# Patient Record
Sex: FEMALE | Race: WHITE | NOT HISPANIC OR LATINO | Employment: OTHER | URBAN - METROPOLITAN AREA
[De-identification: names, ages, dates, MRNs, and addresses within clinical notes are randomized per-mention and may not be internally consistent; named-entity substitution may affect disease eponyms.]

---

## 2024-06-14 ENCOUNTER — APPOINTMENT (OUTPATIENT)
Dept: CARDIOLOGY | Facility: HOSPITAL | Age: 74
DRG: 065 | End: 2024-06-14
Payer: MEDICARE

## 2024-06-14 ENCOUNTER — HOSPITAL ENCOUNTER (INPATIENT)
Facility: HOSPITAL | Age: 74
DRG: 065 | End: 2024-06-14
Attending: STUDENT IN AN ORGANIZED HEALTH CARE EDUCATION/TRAINING PROGRAM | Admitting: INTERNAL MEDICINE
Payer: MEDICARE

## 2024-06-14 ENCOUNTER — APPOINTMENT (OUTPATIENT)
Dept: RADIOLOGY | Facility: HOSPITAL | Age: 74
DRG: 065 | End: 2024-06-14
Payer: MEDICARE

## 2024-06-14 DIAGNOSIS — I63.9 ACUTE ISCHEMIC STROKE (MULTI): ICD-10-CM

## 2024-06-14 DIAGNOSIS — I63.59 CEREBRAL INFARCTION DUE TO UNSPECIFIED OCCLUSION OR STENOSIS OF OTHER CEREBRAL ARTERY (MULTI): ICD-10-CM

## 2024-06-14 DIAGNOSIS — D50.9 IRON DEFICIENCY ANEMIA, UNSPECIFIED IRON DEFICIENCY ANEMIA TYPE: ICD-10-CM

## 2024-06-14 DIAGNOSIS — R09.89 SYMPTOMS OF CEREBROVASCULAR ACCIDENT: Primary | ICD-10-CM

## 2024-06-14 DIAGNOSIS — K92.2 GASTROINTESTINAL HEMORRHAGE, UNSPECIFIED GASTROINTESTINAL HEMORRHAGE TYPE: ICD-10-CM

## 2024-06-14 DIAGNOSIS — E03.9 HYPOTHYROIDISM, UNSPECIFIED TYPE: ICD-10-CM

## 2024-06-14 LAB
ALBUMIN SERPL BCP-MCNC: 4.2 G/DL (ref 3.4–5)
ALP SERPL-CCNC: 111 U/L (ref 33–136)
ALT SERPL W P-5'-P-CCNC: 15 U/L (ref 7–45)
ANION GAP SERPL CALC-SCNC: 13 MMOL/L (ref 10–20)
APTT PPP: 29 SECONDS (ref 27–38)
AST SERPL W P-5'-P-CCNC: 18 U/L (ref 9–39)
BASOPHILS # BLD AUTO: 0.05 X10*3/UL (ref 0–0.1)
BASOPHILS NFR BLD AUTO: 0.9 %
BILIRUB SERPL-MCNC: 0.4 MG/DL (ref 0–1.2)
BUN SERPL-MCNC: 18 MG/DL (ref 6–23)
CALCIUM SERPL-MCNC: 9.2 MG/DL (ref 8.6–10.3)
CARDIAC TROPONIN I PNL SERPL HS: 5 NG/L (ref 0–13)
CHLORIDE SERPL-SCNC: 103 MMOL/L (ref 98–107)
CHOLEST SERPL-MCNC: 197 MG/DL (ref 0–199)
CHOLESTEROL/HDL RATIO: 4.5
CO2 SERPL-SCNC: 25 MMOL/L (ref 21–32)
CREAT SERPL-MCNC: 1.2 MG/DL (ref 0.5–1.05)
EGFRCR SERPLBLD CKD-EPI 2021: 48 ML/MIN/1.73M*2
EOSINOPHIL # BLD AUTO: 0.24 X10*3/UL (ref 0–0.4)
EOSINOPHIL NFR BLD AUTO: 4.2 %
ERYTHROCYTE [DISTWIDTH] IN BLOOD BY AUTOMATED COUNT: 16.1 % (ref 11.5–14.5)
EST. AVERAGE GLUCOSE BLD GHB EST-MCNC: 137 MG/DL
FERRITIN SERPL-MCNC: 14 NG/ML (ref 8–150)
FOLATE SERPL-MCNC: 8.5 NG/ML
GLUCOSE BLD MANUAL STRIP-MCNC: 119 MG/DL (ref 74–99)
GLUCOSE BLD MANUAL STRIP-MCNC: 132 MG/DL (ref 74–99)
GLUCOSE BLD MANUAL STRIP-MCNC: 149 MG/DL (ref 74–99)
GLUCOSE SERPL-MCNC: 148 MG/DL (ref 74–99)
HBA1C MFR BLD: 6.4 %
HCT VFR BLD AUTO: 31.9 % (ref 36–46)
HDLC SERPL-MCNC: 44 MG/DL
HGB BLD-MCNC: 9.2 G/DL (ref 12–16)
HGB RETIC QN: 21 PG (ref 28–38)
HOLD SPECIMEN: NORMAL
IMM GRANULOCYTES # BLD AUTO: 0.01 X10*3/UL (ref 0–0.5)
IMM GRANULOCYTES NFR BLD AUTO: 0.2 % (ref 0–0.9)
IMMATURE RETIC FRACTION: 33.3 %
INR PPP: 1 (ref 0.9–1.1)
IRON SATN MFR SERPL: ABNORMAL %
IRON SERPL-MCNC: 18 UG/DL (ref 35–150)
LDLC SERPL CALC-MCNC: 106 MG/DL
LYMPHOCYTES # BLD AUTO: 2.18 X10*3/UL (ref 0.8–3)
LYMPHOCYTES NFR BLD AUTO: 38.5 %
MCH RBC QN AUTO: 22.9 PG (ref 26–34)
MCHC RBC AUTO-ENTMCNC: 28.8 G/DL (ref 32–36)
MCV RBC AUTO: 79 FL (ref 80–100)
MONOCYTES # BLD AUTO: 0.37 X10*3/UL (ref 0.05–0.8)
MONOCYTES NFR BLD AUTO: 6.5 %
NEUTROPHILS # BLD AUTO: 2.81 X10*3/UL (ref 1.6–5.5)
NEUTROPHILS NFR BLD AUTO: 49.7 %
NON HDL CHOLESTEROL: 153 MG/DL (ref 0–149)
NRBC BLD-RTO: 0 /100 WBCS (ref 0–0)
PLATELET # BLD AUTO: 262 X10*3/UL (ref 150–450)
POCT GLUCOSE: 132 MG/DL (ref 74–99)
POTASSIUM SERPL-SCNC: 4.3 MMOL/L (ref 3.5–5.3)
PROT SERPL-MCNC: 6.5 G/DL (ref 6.4–8.2)
PROTHROMBIN TIME: 11.4 SECONDS (ref 9.8–12.8)
RBC # BLD AUTO: 4.02 X10*6/UL (ref 4–5.2)
RETICS #: 0.1 X10*6/UL (ref 0.02–0.11)
RETICS/RBC NFR AUTO: 2.4 % (ref 0.5–2)
SODIUM SERPL-SCNC: 137 MMOL/L (ref 136–145)
T4 FREE SERPL-MCNC: 0.56 NG/DL (ref 0.61–1.12)
TIBC SERPL-MCNC: ABNORMAL UG/DL
TRIGL SERPL-MCNC: 234 MG/DL (ref 0–149)
TSH SERPL-ACNC: 18.01 MIU/L (ref 0.44–3.98)
UIBC SERPL-MCNC: >450 UG/DL (ref 110–370)
VIT B12 SERPL-MCNC: 298 PG/ML (ref 211–911)
VLDL: 47 MG/DL (ref 0–40)
WBC # BLD AUTO: 5.7 X10*3/UL (ref 4.4–11.3)

## 2024-06-14 PROCEDURE — 83036 HEMOGLOBIN GLYCOSYLATED A1C: CPT | Mod: ELYLAB | Performed by: INTERNAL MEDICINE

## 2024-06-14 PROCEDURE — 70450 CT HEAD/BRAIN W/O DYE: CPT | Mod: RCN

## 2024-06-14 PROCEDURE — 96374 THER/PROPH/DIAG INJ IV PUSH: CPT | Mod: 59

## 2024-06-14 PROCEDURE — 85045 AUTOMATED RETICULOCYTE COUNT: CPT | Performed by: INTERNAL MEDICINE

## 2024-06-14 PROCEDURE — 70450 CT HEAD/BRAIN W/O DYE: CPT | Mod: RCN | Performed by: RADIOLOGY

## 2024-06-14 PROCEDURE — 36415 COLL VENOUS BLD VENIPUNCTURE: CPT | Performed by: STUDENT IN AN ORGANIZED HEALTH CARE EDUCATION/TRAINING PROGRAM

## 2024-06-14 PROCEDURE — 2500000004 HC RX 250 GENERAL PHARMACY W/ HCPCS (ALT 636 FOR OP/ED): Performed by: INTERNAL MEDICINE

## 2024-06-14 PROCEDURE — 82728 ASSAY OF FERRITIN: CPT | Performed by: INTERNAL MEDICINE

## 2024-06-14 PROCEDURE — 84480 ASSAY TRIIODOTHYRONINE (T3): CPT | Mod: ELYLAB | Performed by: INTERNAL MEDICINE

## 2024-06-14 PROCEDURE — G0378 HOSPITAL OBSERVATION PER HR: HCPCS

## 2024-06-14 PROCEDURE — 99223 1ST HOSP IP/OBS HIGH 75: CPT | Performed by: INTERNAL MEDICINE

## 2024-06-14 PROCEDURE — 84443 ASSAY THYROID STIM HORMONE: CPT | Performed by: INTERNAL MEDICINE

## 2024-06-14 PROCEDURE — 2500000004 HC RX 250 GENERAL PHARMACY W/ HCPCS (ALT 636 FOR OP/ED): Performed by: STUDENT IN AN ORGANIZED HEALTH CARE EDUCATION/TRAINING PROGRAM

## 2024-06-14 PROCEDURE — 80061 LIPID PANEL: CPT | Performed by: INTERNAL MEDICINE

## 2024-06-14 PROCEDURE — 2500000001 HC RX 250 WO HCPCS SELF ADMINISTERED DRUGS (ALT 637 FOR MEDICARE OP): Performed by: INTERNAL MEDICINE

## 2024-06-14 PROCEDURE — 2500000002 HC RX 250 W HCPCS SELF ADMINISTERED DRUGS (ALT 637 FOR MEDICARE OP, ALT 636 FOR OP/ED): Performed by: INTERNAL MEDICINE

## 2024-06-14 PROCEDURE — 2550000001 HC RX 255 CONTRASTS: Performed by: STUDENT IN AN ORGANIZED HEALTH CARE EDUCATION/TRAINING PROGRAM

## 2024-06-14 PROCEDURE — 70496 CT ANGIOGRAPHY HEAD: CPT | Performed by: RADIOLOGY

## 2024-06-14 PROCEDURE — 84484 ASSAY OF TROPONIN QUANT: CPT | Performed by: STUDENT IN AN ORGANIZED HEALTH CARE EDUCATION/TRAINING PROGRAM

## 2024-06-14 PROCEDURE — 93005 ELECTROCARDIOGRAM TRACING: CPT

## 2024-06-14 PROCEDURE — 82607 VITAMIN B-12: CPT | Performed by: INTERNAL MEDICINE

## 2024-06-14 PROCEDURE — 82746 ASSAY OF FOLIC ACID SERUM: CPT | Performed by: INTERNAL MEDICINE

## 2024-06-14 PROCEDURE — 70498 CT ANGIOGRAPHY NECK: CPT | Performed by: RADIOLOGY

## 2024-06-14 PROCEDURE — 80053 COMPREHEN METABOLIC PANEL: CPT | Performed by: STUDENT IN AN ORGANIZED HEALTH CARE EDUCATION/TRAINING PROGRAM

## 2024-06-14 PROCEDURE — 85025 COMPLETE CBC W/AUTO DIFF WBC: CPT | Performed by: STUDENT IN AN ORGANIZED HEALTH CARE EDUCATION/TRAINING PROGRAM

## 2024-06-14 PROCEDURE — 70551 MRI BRAIN STEM W/O DYE: CPT | Performed by: STUDENT IN AN ORGANIZED HEALTH CARE EDUCATION/TRAINING PROGRAM

## 2024-06-14 PROCEDURE — 96361 HYDRATE IV INFUSION ADD-ON: CPT

## 2024-06-14 PROCEDURE — 83540 ASSAY OF IRON: CPT | Performed by: INTERNAL MEDICINE

## 2024-06-14 PROCEDURE — 84439 ASSAY OF FREE THYROXINE: CPT | Performed by: INTERNAL MEDICINE

## 2024-06-14 PROCEDURE — 70498 CT ANGIOGRAPHY NECK: CPT

## 2024-06-14 PROCEDURE — 99285 EMERGENCY DEPT VISIT HI MDM: CPT

## 2024-06-14 PROCEDURE — 85730 THROMBOPLASTIN TIME PARTIAL: CPT | Performed by: STUDENT IN AN ORGANIZED HEALTH CARE EDUCATION/TRAINING PROGRAM

## 2024-06-14 PROCEDURE — 82947 ASSAY GLUCOSE BLOOD QUANT: CPT

## 2024-06-14 PROCEDURE — 96372 THER/PROPH/DIAG INJ SC/IM: CPT | Performed by: INTERNAL MEDICINE

## 2024-06-14 PROCEDURE — 82947 ASSAY GLUCOSE BLOOD QUANT: CPT | Mod: 91

## 2024-06-14 PROCEDURE — 70551 MRI BRAIN STEM W/O DYE: CPT

## 2024-06-14 PROCEDURE — 85610 PROTHROMBIN TIME: CPT | Performed by: STUDENT IN AN ORGANIZED HEALTH CARE EDUCATION/TRAINING PROGRAM

## 2024-06-14 RX ORDER — ALPRAZOLAM 0.5 MG/1
0.5 TABLET ORAL 3 TIMES DAILY PRN
Status: DISCONTINUED | OUTPATIENT
Start: 2024-06-14 | End: 2024-06-17 | Stop reason: HOSPADM

## 2024-06-14 RX ORDER — HYDRALAZINE HYDROCHLORIDE 20 MG/ML
10 INJECTION INTRAMUSCULAR; INTRAVENOUS
Status: ACTIVE | OUTPATIENT
Start: 2024-06-14 | End: 2024-06-16

## 2024-06-14 RX ORDER — LABETALOL HYDROCHLORIDE 5 MG/ML
10 INJECTION, SOLUTION INTRAVENOUS EVERY 10 MIN PRN
Status: DISCONTINUED | OUTPATIENT
Start: 2024-06-14 | End: 2024-06-16

## 2024-06-14 RX ORDER — PSEUDOEPHEDRINE HCL 30 MG
30 TABLET ORAL 2 TIMES DAILY PRN
COMMUNITY

## 2024-06-14 RX ORDER — METOCLOPRAMIDE HYDROCHLORIDE 5 MG/ML
10 INJECTION INTRAMUSCULAR; INTRAVENOUS ONCE
Status: COMPLETED | OUTPATIENT
Start: 2024-06-14 | End: 2024-06-14

## 2024-06-14 RX ORDER — NAPROXEN 250 MG/1
500 TABLET ORAL EVERY 6 HOURS PRN
COMMUNITY
End: 2024-06-17 | Stop reason: HOSPADM

## 2024-06-14 RX ORDER — DEXTROSE 50 % IN WATER (D50W) INTRAVENOUS SYRINGE
25
Status: DISCONTINUED | OUTPATIENT
Start: 2024-06-14 | End: 2024-06-17 | Stop reason: HOSPADM

## 2024-06-14 RX ORDER — ALPRAZOLAM 0.5 MG/1
0.5 TABLET ORAL 3 TIMES DAILY
COMMUNITY

## 2024-06-14 RX ORDER — INSULIN LISPRO 100 [IU]/ML
0-10 INJECTION, SOLUTION INTRAVENOUS; SUBCUTANEOUS
Status: DISCONTINUED | OUTPATIENT
Start: 2024-06-14 | End: 2024-06-17 | Stop reason: HOSPADM

## 2024-06-14 RX ORDER — METOPROLOL SUCCINATE 50 MG/1
50 TABLET, EXTENDED RELEASE ORAL DAILY
COMMUNITY

## 2024-06-14 RX ORDER — HEPARIN SODIUM 5000 [USP'U]/ML
5000 INJECTION, SOLUTION INTRAVENOUS; SUBCUTANEOUS EVERY 8 HOURS
Status: DISCONTINUED | OUTPATIENT
Start: 2024-06-14 | End: 2024-06-17 | Stop reason: HOSPADM

## 2024-06-14 RX ORDER — ATORVASTATIN CALCIUM 20 MG/1
40 TABLET, FILM COATED ORAL NIGHTLY
Status: DISCONTINUED | OUTPATIENT
Start: 2024-06-14 | End: 2024-06-17 | Stop reason: HOSPADM

## 2024-06-14 RX ORDER — ACETAMINOPHEN 325 MG/1
975 TABLET ORAL ONCE
Status: COMPLETED | OUTPATIENT
Start: 2024-06-14 | End: 2024-06-14

## 2024-06-14 RX ORDER — HYDRALAZINE HYDROCHLORIDE 25 MG/1
25 TABLET, FILM COATED ORAL EVERY 6 HOURS PRN
Status: DISCONTINUED | OUTPATIENT
Start: 2024-06-16 | End: 2024-06-17 | Stop reason: HOSPADM

## 2024-06-14 RX ORDER — LATANOPROST 50 UG/ML
1 SOLUTION/ DROPS OPHTHALMIC NIGHTLY
COMMUNITY

## 2024-06-14 RX ORDER — MELOXICAM 15 MG/1
15 TABLET ORAL DAILY PRN
COMMUNITY
End: 2024-06-17 | Stop reason: HOSPADM

## 2024-06-14 RX ORDER — METFORMIN HYDROCHLORIDE 1000 MG/1
1000 TABLET ORAL
COMMUNITY

## 2024-06-14 RX ORDER — ONDANSETRON HYDROCHLORIDE 2 MG/ML
4 INJECTION, SOLUTION INTRAVENOUS EVERY 8 HOURS PRN
Status: DISCONTINUED | OUTPATIENT
Start: 2024-06-14 | End: 2024-06-17 | Stop reason: HOSPADM

## 2024-06-14 RX ORDER — ERGOCALCIFEROL 1.25 MG/1
1.25 CAPSULE ORAL
COMMUNITY

## 2024-06-14 RX ORDER — LEVOTHYROXINE SODIUM 50 UG/1
50 TABLET ORAL DAILY
Status: DISCONTINUED | OUTPATIENT
Start: 2024-06-14 | End: 2024-06-17

## 2024-06-14 RX ORDER — PAROXETINE HYDROCHLORIDE 40 MG/1
40 TABLET, FILM COATED ORAL DAILY
COMMUNITY

## 2024-06-14 RX ORDER — ASPIRIN 81 MG/1
81 TABLET ORAL DAILY
Status: DISCONTINUED | OUTPATIENT
Start: 2024-06-14 | End: 2024-06-17 | Stop reason: HOSPADM

## 2024-06-14 RX ORDER — PAROXETINE HYDROCHLORIDE 20 MG/1
40 TABLET, FILM COATED ORAL DAILY
Status: DISCONTINUED | OUTPATIENT
Start: 2024-06-14 | End: 2024-06-17 | Stop reason: HOSPADM

## 2024-06-14 RX ORDER — DEXTROSE 50 % IN WATER (D50W) INTRAVENOUS SYRINGE
12.5
Status: DISCONTINUED | OUTPATIENT
Start: 2024-06-14 | End: 2024-06-17 | Stop reason: HOSPADM

## 2024-06-14 SDOH — SOCIAL STABILITY: SOCIAL INSECURITY: WERE YOU ABLE TO COMPLETE ALL THE BEHAVIORAL HEALTH SCREENINGS?: YES

## 2024-06-14 SDOH — SOCIAL STABILITY: SOCIAL INSECURITY: DOES ANYONE TRY TO KEEP YOU FROM HAVING/CONTACTING OTHER FRIENDS OR DOING THINGS OUTSIDE YOUR HOME?: NO

## 2024-06-14 SDOH — SOCIAL STABILITY: SOCIAL INSECURITY: DO YOU FEEL UNSAFE GOING BACK TO THE PLACE WHERE YOU ARE LIVING?: NO

## 2024-06-14 SDOH — SOCIAL STABILITY: SOCIAL INSECURITY: DO YOU FEEL ANYONE HAS EXPLOITED OR TAKEN ADVANTAGE OF YOU FINANCIALLY OR OF YOUR PERSONAL PROPERTY?: NO

## 2024-06-14 SDOH — SOCIAL STABILITY: SOCIAL INSECURITY: ARE YOU OR HAVE YOU BEEN THREATENED OR ABUSED PHYSICALLY, EMOTIONALLY, OR SEXUALLY BY ANYONE?: NO

## 2024-06-14 SDOH — SOCIAL STABILITY: SOCIAL INSECURITY: HAVE YOU HAD THOUGHTS OF HARMING ANYONE ELSE?: NO

## 2024-06-14 SDOH — SOCIAL STABILITY: SOCIAL INSECURITY: HAVE YOU HAD ANY THOUGHTS OF HARMING ANYONE ELSE?: NO

## 2024-06-14 SDOH — SOCIAL STABILITY: SOCIAL INSECURITY: HAS ANYONE EVER THREATENED TO HURT YOUR FAMILY OR YOUR PETS?: NO

## 2024-06-14 SDOH — SOCIAL STABILITY: SOCIAL INSECURITY: ABUSE: ADULT

## 2024-06-14 SDOH — SOCIAL STABILITY: SOCIAL INSECURITY: ARE THERE ANY APPARENT SIGNS OF INJURIES/BEHAVIORS THAT COULD BE RELATED TO ABUSE/NEGLECT?: NO

## 2024-06-14 ASSESSMENT — PAIN SCALES - GENERAL
PAINLEVEL_OUTOF10: 0 - NO PAIN
PAINLEVEL_OUTOF10: 4
PAINLEVEL_OUTOF10: 6

## 2024-06-14 ASSESSMENT — ACTIVITIES OF DAILY LIVING (ADL)
WALKS IN HOME: INDEPENDENT
HEARING - RIGHT EAR: FUNCTIONAL
LACK_OF_TRANSPORTATION: NO
HEARING - LEFT EAR: FUNCTIONAL
JUDGMENT_ADEQUATE_SAFELY_COMPLETE_DAILY_ACTIVITIES: YES
BATHING: INDEPENDENT
PATIENT'S MEMORY ADEQUATE TO SAFELY COMPLETE DAILY ACTIVITIES?: YES
DRESSING YOURSELF: INDEPENDENT
FEEDING YOURSELF: INDEPENDENT
TOILETING: INDEPENDENT
GROOMING: INDEPENDENT
ADEQUATE_TO_COMPLETE_ADL: YES

## 2024-06-14 ASSESSMENT — LIFESTYLE VARIABLES
AUDIT-C TOTAL SCORE: 0
TOTAL SCORE: 0
SKIP TO QUESTIONS 9-10: 1
HOW MANY STANDARD DRINKS CONTAINING ALCOHOL DO YOU HAVE ON A TYPICAL DAY: PATIENT DOES NOT DRINK
HAVE YOU EVER FELT YOU SHOULD CUT DOWN ON YOUR DRINKING: NO
EVER FELT BAD OR GUILTY ABOUT YOUR DRINKING: NO
HOW OFTEN DO YOU HAVE A DRINK CONTAINING ALCOHOL: NEVER
AUDIT-C TOTAL SCORE: 0
HOW OFTEN DO YOU HAVE 6 OR MORE DRINKS ON ONE OCCASION: NEVER
EVER HAD A DRINK FIRST THING IN THE MORNING TO STEADY YOUR NERVES TO GET RID OF A HANGOVER: NO
HAVE PEOPLE ANNOYED YOU BY CRITICIZING YOUR DRINKING: NO

## 2024-06-14 ASSESSMENT — COGNITIVE AND FUNCTIONAL STATUS - GENERAL
DRESSING REGULAR UPPER BODY CLOTHING: A LITTLE
MOVING FROM LYING ON BACK TO SITTING ON SIDE OF FLAT BED WITH BEDRAILS: A LITTLE
WALKING IN HOSPITAL ROOM: A LOT
MOVING TO AND FROM BED TO CHAIR: A LITTLE
TURNING FROM BACK TO SIDE WHILE IN FLAT BAD: A LITTLE
HELP NEEDED FOR BATHING: A LITTLE
STANDING UP FROM CHAIR USING ARMS: A LITTLE
DRESSING REGULAR LOWER BODY CLOTHING: A LITTLE
CLIMB 3 TO 5 STEPS WITH RAILING: A LOT
PATIENT BASELINE BEDBOUND: NO
DAILY ACTIVITIY SCORE: 18
EATING MEALS: A LITTLE
MOBILITY SCORE: 16
PERSONAL GROOMING: A LITTLE
TOILETING: A LITTLE

## 2024-06-14 ASSESSMENT — PAIN DESCRIPTION - LOCATION: LOCATION: HEAD

## 2024-06-14 ASSESSMENT — PAIN - FUNCTIONAL ASSESSMENT: PAIN_FUNCTIONAL_ASSESSMENT: 0-10

## 2024-06-14 ASSESSMENT — PATIENT HEALTH QUESTIONNAIRE - PHQ9
SUM OF ALL RESPONSES TO PHQ9 QUESTIONS 1 & 2: 0
2. FEELING DOWN, DEPRESSED OR HOPELESS: NOT AT ALL
1. LITTLE INTEREST OR PLEASURE IN DOING THINGS: NOT AT ALL

## 2024-06-14 ASSESSMENT — PAIN DESCRIPTION - PROGRESSION: CLINICAL_PROGRESSION: GRADUALLY IMPROVING

## 2024-06-14 NOTE — H&P
Medical Group History and Physical    ASSESSMENT & PLAN:     Acute ischemic stroke   -p/w L sided UE and LE weakness, numbness, L facial droop, slurred speech  -CT head noncon showed possible L parietal hypo-attenuating focus, although does not correlate with her presenting symptoms  -CTA H+N neg for LVO  -EKG did not show Afib  Plan:  -Neuro consult. ASA 81, high intensity statin. Check A1C, lipid panel. MRI brain. TTE. Tele monitoring. Permissive HTN for now.     Microcytic anemia - Hgb 9.2 on admission, previous baseline in 2023 was normal Hgb. No overt bleeding. Reportedly hemooccult positive in ED. Check anemia labs. Monitor CBC. If displays overt bleeding, dropping Hgb, labs show HENOK, will consult GI.   HTN - hold home metoprolol, permissive HTN in s/o above for now  DM2 - check A1C. Accuchecks, ssi for now. Hold home metformin.   CKD3 - Scr at baseline, monitor  MDD - home paxil  Anxiety - home Xanax PRN  Vte ppx sqh  Full code, discussed with pt    Emory Peres MD    HISTORY OF PRESENT ILLNESS:   Chief Complaint: L sided weakness, numbness, slurred speech    History Of Present Illness:    73F with PMH of HTN, DM2, DLD, GERD, MDD, anxiety who presented with acute onset L sided facial droop, L sided weakness, slurred speech. She had been having few days of HA, feeling fatigued. Last night she did not sleep well, got up around 2AM and noticed her LUE and LLE were numb throughout. She improved somewhat, and then went back to sleep. She got up again in the middle of the night and had LUE and LLE weakness, and fell into her couch. Her daughter thought she had slurred speech on the phone. She noticed she had a L facial droop as well.     In the ED, afebrile, HDS, on room air. Labs showed stable CKD3 scr 1.20, new microcytic anemia to Hgb 9.2. CT head noncon showed L parietal white matter hypo-attenuating focus. CTA H+N neg for LVO.     Review of systems: 10 point review of systems is otherwise negative except  as mentioned above.    PAST HISTORIES:     Past Medical History:  She has a past medical history of Anxiety, Depression, Diabetes mellitus (Multi), and Hypertension.    Past Surgical History:  She has a past surgical history that includes Cholecystectomy; Total knee arthroplasty (Right); and Tubal ligation.      Social History:  She reports that she has never smoked. She has never used smokeless tobacco. She reports that she does not currently use alcohol. She reports that she does not use drugs.    Family History:  No family history on file.     Allergies:  Morphine    OBJECTIVE:     Last Recorded Vitals:  Vitals:    06/14/24 1151 06/14/24 1154 06/14/24 1157 06/14/24 1200   BP:       Pulse: 68 66 68 66   Resp:       Temp:       SpO2: 97% 97% 97% 97%   Weight:       Height:           Last I/O:  No intake/output data recorded.    Physical Exam:  GEN: healthy appearing, appears stated age, NAD  CV: RRR, no m/r/g, no LE edema  LUNGS: CTAB, no w/r/c  ABD: soft, NT, obese  SKIN: no rashes  MSK; no gross deformities, normal joints  NEURO: A+Ox3, L facial droop, LUE and LLE 4/5 strength  PSYCH: appropriate mood, affect    Scheduled Medications      PRN Medications      Continuous Medications      Outpatient Medications:  Prior to Admission medications    Medication Sig Start Date End Date Taking? Authorizing Provider   ALPRAZolam (Xanax) 0.5 mg tablet Take 1 tablet (0.5 mg) by mouth 3 times a day.    Historical Provider, MD   metFORMIN (Glucophage) 1,000 mg tablet Take 1 tablet (1,000 mg) by mouth once daily with breakfast.    Historical Provider, MD   metoprolol succinate XL (Toprol-XL) 50 mg 24 hr tablet Take 1 tablet (50 mg) by mouth once daily.    Historical Provider, MD   PARoxetine (Paxil) 40 mg tablet Take 1 tablet (40 mg) by mouth once daily.    Historical Provider, MD       LABS AND IMAGING:     Labs:  Results for orders placed or performed during the hospital encounter of 06/14/24 (from the past 24 hour(s))   CBC  and Auto Differential   Result Value Ref Range    WBC 5.7 4.4 - 11.3 x10*3/uL    nRBC 0.0 0.0 - 0.0 /100 WBCs    RBC 4.02 4.00 - 5.20 x10*6/uL    Hemoglobin 9.2 (L) 12.0 - 16.0 g/dL    Hematocrit 31.9 (L) 36.0 - 46.0 %    MCV 79 (L) 80 - 100 fL    MCH 22.9 (L) 26.0 - 34.0 pg    MCHC 28.8 (L) 32.0 - 36.0 g/dL    RDW 16.1 (H) 11.5 - 14.5 %    Platelets 262 150 - 450 x10*3/uL    Neutrophils % 49.7 40.0 - 80.0 %    Immature Granulocytes %, Automated 0.2 0.0 - 0.9 %    Lymphocytes % 38.5 13.0 - 44.0 %    Monocytes % 6.5 2.0 - 10.0 %    Eosinophils % 4.2 0.0 - 6.0 %    Basophils % 0.9 0.0 - 2.0 %    Neutrophils Absolute 2.81 1.60 - 5.50 x10*3/uL    Immature Granulocytes Absolute, Automated 0.01 0.00 - 0.50 x10*3/uL    Lymphocytes Absolute 2.18 0.80 - 3.00 x10*3/uL    Monocytes Absolute 0.37 0.05 - 0.80 x10*3/uL    Eosinophils Absolute 0.24 0.00 - 0.40 x10*3/uL    Basophils Absolute 0.05 0.00 - 0.10 x10*3/uL   Comprehensive metabolic panel   Result Value Ref Range    Glucose 148 (H) 74 - 99 mg/dL    Sodium 137 136 - 145 mmol/L    Potassium 4.3 3.5 - 5.3 mmol/L    Chloride 103 98 - 107 mmol/L    Bicarbonate 25 21 - 32 mmol/L    Anion Gap 13 10 - 20 mmol/L    Urea Nitrogen 18 6 - 23 mg/dL    Creatinine 1.20 (H) 0.50 - 1.05 mg/dL    eGFR 48 (L) >60 mL/min/1.73m*2    Calcium 9.2 8.6 - 10.3 mg/dL    Albumin 4.2 3.4 - 5.0 g/dL    Alkaline Phosphatase 111 33 - 136 U/L    Total Protein 6.5 6.4 - 8.2 g/dL    AST 18 9 - 39 U/L    Bilirubin, Total 0.4 0.0 - 1.2 mg/dL    ALT 15 7 - 45 U/L   Troponin I, High Sensitivity   Result Value Ref Range    Troponin I, High Sensitivity 5 0 - 13 ng/L   Protime-INR   Result Value Ref Range    Protime 11.4 9.8 - 12.8 seconds    INR 1.0 0.9 - 1.1   APTT   Result Value Ref Range    aPTT 29 27 - 38 seconds   Green Top   Result Value Ref Range    Extra Tube Hold for add-ons.    Lavender Top   Result Value Ref Range    Extra Tube Hold for add-ons.    SST TOP   Result Value Ref Range    Extra Tube Hold  for add-ons.    POCT glucose   Result Value Ref Range    POCT Glucose 132 (A) 74 - 99 mg/dL   POCT GLUCOSE   Result Value Ref Range    POCT Glucose 132 (H) 74 - 99 mg/dL   ECG 12 lead   Result Value Ref Range    Ventricular Rate 74 BPM    Atrial Rate 74 BPM    MN Interval 182 ms    QRS Duration 86 ms    QT Interval 416 ms    QTC Calculation(Bazett) 461 ms    P Axis 27 degrees    R Axis -11 degrees    T Axis -5 degrees    QRS Count 12 beats    Q Onset 225 ms    P Onset 134 ms    P Offset 195 ms    T Offset 433 ms    QTC Fredericia 446 ms        Imaging:  CT brain attack head wo IV contrast  Narrative: Interpreted By:  Rayshawn Blandon,   STUDY:  CT BRAIN ATTACK HEAD WO IV CONTRAST;  6/14/2024 10:49 am      INDICATION:  Signs/Symptoms:Stroke Evaluation.      COMPARISON:  None.      ACCESSION NUMBER(S):  [id number]      ORDERING CLINICIAN:  ARDEN DELACRUZ      TECHNIQUE:  Noncontrast axial CT scan of head was performed. Angled reformats in  brain and bone windows and coronal and sagittal reconstructions were  generated. The images were reviewed in bone, brain, blood and soft  tissue windows.      FINDINGS:  CSF Spaces: The ventricles, sulci and basal cisterns are within  normal limits the patient's age. There is no extraaxial fluid  collection.      Parenchyma: A roughly 2 x 1 x 1 cm mildly hypoattenuating focus in  the left parietal white matter could be due to microangiopathy or an  infarct of uncertain age, but does not correlate with the history of  left-sided weakness.It could be further evaluated with MRI including  diffusion-weighted imaging if clinically indicated. The grey-white  differentiation is intact. There is no midline shift, other mass  effect or intracranial hemorrhage. Moderate bilateral carotid siphon  and minimal distal right vertebral artery calcification is noted.      Calvarium and bone: The calvarium is unremarkable. Edentulous.      Paranasal sinuses and mastoids: The mastoid air cells  and tympanic  cavities are well developed and clear bilaterally. The visualized  left maxillary sinus is hypoplastic and opacified with dense contents  and bony wall thickening. This could be more completely evaluated  with nonemergent sinus CT. An opacified left ethmoid air cell is  unlikely to be clinically significant. The frontal sinus is not  developed.      Impression: Left parietal white matter hypoattenuating focus as discussed above.      No evidence of intracranial hemorrhage or displaced skull fracture.      Hypoplastic opacified left maxillary sinus.      MACRO:  Rayshawn Blandon MD discussed the significance and urgency of this  critical finding by telephone with  ARDEN DELACRUZ on 6/14/2024 at  11:20 am.  (**-RCF-**) Findings:  See findings.          Signed by: Rayshawn Blandon 6/14/2024 11:22 AM  Dictation workstation:   [id number]  ECG 12 lead  Sinus rhythm with frequent Premature ventricular complexes  Moderate voltage criteria for LVH, may be normal variant  Cannot rule out Septal infarct , age undetermined  Abnormal ECG  No previous ECGs available  See ED provider note for full interpretation and clinical correlation  CT brain attack angio head and neck W and WO IV contrast  Narrative: Interpreted By:  Frank Raman,   STUDY:  CT BRAIN ATTACK ANGIO HEAD AND NECK W AND WO IV CONTRAST; ;  6/14/2024 10:56 am      INDICATION:  Signs/Symptoms:left side weakness, facial droop.      COMPARISON:  None.      ACCESSION NUMBER(S):  [id number]      ORDERING CLINICIAN:  ARDEN DELACRUZ      TECHNIQUE:  Thin cut axial CT images were generated over the head prior to and  during the arterial passage of a full contrast bolus.  The bolus was  generated with a power injector and followed with immediate saline  flush. These image data were subtracted and then used for 3-D  reconstructions. Maximum intensity projections and shaded surface  displays were generated in multiple planes. In addition images  were  transferred into a 3-D processing program and additional projections  and displays were reviewed by the interpreting physician.      FINDINGS:  The CT angiogram through the upper thorax demonstrates mild  atherosclerotic calcifications along the aortic arch and origins of  the right brachiocephalic artery and left subclavian artery. No  significant stenosis noted along the origins of the right  brachiocephalic artery, left common carotid, or left subclavian  artery. No significant stenosis is noted along the origins of the  vertebral arteries.      The CT images of the neck demonstrates atherosclerotic calcifications  along the origin/proximal internal carotid arteries bilaterally  contributing to mild non hemodynamically significant narrowing  utilizing the more distal cervical segments of the internal carotid  arteries as a point of reference. No significant stenosis is noted  along the cervical segments of the vertebral arteries. There is a  left dominant vertebral artery.      The CT angiogram of the head demonstrates a small caliber distal  right vertebral artery which terminates in the right posterior  inferior cerebellar artery. No significant stenosis is noted along  the distal left dominant vertebral artery or basilar artery.  Atherosclerotic calcifications are noted along the bilateral carotid  siphons contributing to mild non hemodynamically significant  narrowing. There is a hypoplastic A1 segment of the right anterior  cerebral artery with the more distal right anterior cerebral artery  supplied from the left via a patent anterior communicating artery.  Patent posterior communicating arteries are identified bilaterally.  No large vessel branch cutoffs of the anterior cerebral arteries,  middle cerebral arteries, or posterior cerebral arteries are noted.  No intracranial aneurysm is noted.      The source CT angiogram images of the head demonstrate mild to  moderate brain parenchymal volume loss.  There are nonspecific white  matter changes noted within cerebral hemispheres bilaterally which  while nonspecific, given the patient's age, may represent sequelae of  small-vessel ischemic change. There is no midline shift. There is  opacification of an asymmetrically hypoplastic left maxillary sinus  as well as opacification of a few left ethmoid air cells.      The source CT angiogram images of the neck demonstrate multilevel  cervical spondylosis most pronounced at the C5/6 and C6/7 levels.          Impression: The CT angiogram through the upper thorax demonstrates mild  atherosclerotic calcifications along the aortic arch and origins of  the right brachiocephalic artery and left subclavian artery. No  significant stenosis noted along the origins of the right  brachiocephalic artery, left common carotid, or left subclavian  artery. No significant stenosis is noted along the origins of the  vertebral arteries.      The CT images of the neck demonstrates atherosclerotic calcifications  along the origin/proximal internal carotid arteries bilaterally  contributing to mild non hemodynamically significant narrowing  utilizing the more distal cervical segments of the internal carotid  arteries as a point of reference. No significant stenosis is noted  along the cervical segments of the vertebral arteries. There is a  left dominant vertebral artery.      The CT angiogram of the head demonstrates a small caliber distal  right vertebral artery which terminates in the right posterior  inferior cerebellar artery. No significant stenosis is noted along  the distal left dominant vertebral artery or basilar artery.  Atherosclerotic calcifications are noted along the bilateral carotid  siphons contributing to mild non hemodynamically significant  narrowing. There is a hypoplastic A1 segment of the right anterior  cerebral artery with the more distal right anterior cerebral artery  supplied from the left via a patent anterior  communicating artery.  Patent posterior communicating arteries are identified bilaterally.  No large vessel branch cutoffs of the anterior cerebral arteries,  middle cerebral arteries, or posterior cerebral arteries are noted.  No intracranial aneurysm is noted.      The source CT angiogram images of the head demonstrate mild to  moderate brain parenchymal volume loss. There are nonspecific white  matter changes noted within cerebral hemispheres bilaterally which  while nonspecific, given the patient's age, may represent sequelae of  small-vessel ischemic change.      The source CT angiogram images of the neck demonstrate multilevel  cervical spondylosis most pronounced at the C5/6 and C6/7 levels.      MACRO:  None      Signed by: Frank Raman 6/14/2024 11:11 AM  Dictation workstation:   [id number]

## 2024-06-14 NOTE — ED PROVIDER NOTES
HPI   No chief complaint on file.      Patient is a 73-year-old female with history of diabetes on metformin, who presents for stroke symptoms.  Patient states she had trouble sleeping and went to the bathroom and noticed that she had left-sided weakness, numbness, facial droop.  No history of CVA.  States she noticed her symptoms at 230 this morning.  No anticoagulation use.  No recent fevers, chills, vomiting, diarrhea, CP, SOB. EMS reports glucose in the 130s.                          No data recorded       NIH Stroke Scale: 8             Patient History   No past medical history on file.  No past surgical history on file.  No family history on file.  Social History     Tobacco Use    Smoking status: Not on file    Smokeless tobacco: Not on file   Substance Use Topics    Alcohol use: Not on file    Drug use: Not on file       Physical Exam   ED Triage Vitals [06/14/24 1100]   Temperature Heart Rate Respirations BP   36.3 °C (97.3 °F) 73 18 154/68      Pulse Ox Temp src Heart Rate Source Patient Position   98 % -- -- --      BP Location FiO2 (%)     -- --       Physical Exam  Constitutional:       General: She is not in acute distress.  HENT:      Head: Normocephalic.   Eyes:      Extraocular Movements: Extraocular movements intact.      Conjunctiva/sclera: Conjunctivae normal.      Pupils: Pupils are equal, round, and reactive to light.   Cardiovascular:      Rate and Rhythm: Normal rate and regular rhythm.      Pulses: Normal pulses.      Heart sounds: Normal heart sounds.   Pulmonary:      Effort: Pulmonary effort is normal.      Breath sounds: Normal breath sounds.   Abdominal:      General: There is no distension.      Palpations: Abdomen is soft. There is no mass.      Tenderness: There is no abdominal tenderness. There is no guarding.   Genitourinary:     Comments: Rectal exam conducted with nurse chaperone, no external lesions/hemorrhoids/fissures.  No internal masses or fluctuance.  Hemoccult positive.   No melena or bloody stool.  Musculoskeletal:         General: No deformity.      Cervical back: Normal range of motion and neck supple.      Right lower leg: No edema.      Left lower leg: No edema.   Skin:     General: Skin is warm and dry.      Findings: No lesion or rash.   Neurological:      General: No focal deficit present.      Mental Status: She is alert and oriented to person, place, and time. Mental status is at baseline.      Cranial Nerves: No cranial nerve deficit.      Sensory: No sensory deficit.      Motor: No weakness.      Comments: NIH 7 (3 facial palsy on the left, 1 left arm drift, 1 left leg drift, 1 decree sensation in left leg, 1 mild dysarthria), Van negative   Psychiatric:         Mood and Affect: Mood normal.         ED Course & MDM   Diagnoses as of 06/14/24 1311   Symptoms of cerebrovascular accident   Gastrointestinal hemorrhage, unspecified gastrointestinal hemorrhage type       Medical Decision Making  EKG my interpretation: Rate 74, rhythm irregular, axis normal, CO 22, QRS 86, QTc 461, T waves: Unremarkable, ST segments: No elevations or depressions, dictation: Sinus rhythm with PVCs, no STEMI    Patient is a 73-year-old female with above-stated past medical history presents for stroke symptoms.  Stroke alert was activated from the field.  Patient is outside the window for TNK.  CT Noncon showed a left parietal white matter hypoattenuation, however this does not correspond with the patient's deficits on the left side of her body.  CT angio did not show signs of acute thrombosis. I did discuss the patient's CT angio findings with the on-call [facility] neurologist who did not feel any findings were of an acute nature and all of the noted findings were normal variants.  No occasion for transfer.  Patient's EKG is nonischemic and her troponins are negative.  Laboratory work does show anemia and her Hemoccult was positive, though patient denies any recent bleeding issues, bloody or black  stools.  Low suspicion that this is the etiology of her symptoms, though may be exacerbating her presentation.  Patient was admitted to the medicine service for neurology consult and stroke rule out.    Disclaimer: This note was dictated using speech recognition software. Minor errors in transcription may be present. Please call if questions.     Harmeet Harris MD  [facility] Emergency Medicine  Contact on [employer]        Problems Addressed:  Gastrointestinal hemorrhage, unspecified gastrointestinal hemorrhage type: acute illness or injury  Symptoms of cerebrovascular accident: acute illness or injury    Amount and/or Complexity of Data Reviewed  Labs: ordered.  Radiology: ordered.  ECG/medicine tests: ordered and independent interpretation performed.        Procedure  Procedures     Harmeet Harris MD  06/14/24 1507       Harmeet Harris MD  06/14/24 [id number]

## 2024-06-15 ENCOUNTER — APPOINTMENT (OUTPATIENT)
Dept: CARDIOLOGY | Facility: HOSPITAL | Age: 74
DRG: 065 | End: 2024-06-15
Payer: MEDICARE

## 2024-06-15 PROBLEM — R09.89: Status: ACTIVE | Noted: 2024-06-15

## 2024-06-15 LAB
ABO GROUP (TYPE) IN BLOOD: NORMAL
ANION GAP SERPL CALC-SCNC: 12 MMOL/L (ref 10–20)
ANTIBODY SCREEN: NORMAL
BUN SERPL-MCNC: 17 MG/DL (ref 6–23)
CALCIUM SERPL-MCNC: 9.1 MG/DL (ref 8.6–10.3)
CHLORIDE SERPL-SCNC: 105 MMOL/L (ref 98–107)
CO2 SERPL-SCNC: 25 MMOL/L (ref 21–32)
CREAT SERPL-MCNC: 1.06 MG/DL (ref 0.5–1.05)
EGFRCR SERPLBLD CKD-EPI 2021: 56 ML/MIN/1.73M*2
ERYTHROCYTE [DISTWIDTH] IN BLOOD BY AUTOMATED COUNT: 16.4 % (ref 11.5–14.5)
GLUCOSE BLD MANUAL STRIP-MCNC: 119 MG/DL (ref 74–99)
GLUCOSE BLD MANUAL STRIP-MCNC: 136 MG/DL (ref 74–99)
GLUCOSE BLD MANUAL STRIP-MCNC: 147 MG/DL (ref 74–99)
GLUCOSE BLD MANUAL STRIP-MCNC: 164 MG/DL (ref 74–99)
GLUCOSE SERPL-MCNC: 124 MG/DL (ref 74–99)
HCT VFR BLD AUTO: 29.9 % (ref 36–46)
HGB BLD-MCNC: 8.6 G/DL (ref 12–16)
HOLD SPECIMEN: NORMAL
MAGNESIUM SERPL-MCNC: 1.87 MG/DL (ref 1.6–2.4)
MCH RBC QN AUTO: 23.1 PG (ref 26–34)
MCHC RBC AUTO-ENTMCNC: 28.8 G/DL (ref 32–36)
MCV RBC AUTO: 80 FL (ref 80–100)
NRBC BLD-RTO: 0 /100 WBCS (ref 0–0)
PLATELET # BLD AUTO: 227 X10*3/UL (ref 150–450)
POTASSIUM SERPL-SCNC: 3.8 MMOL/L (ref 3.5–5.3)
RBC # BLD AUTO: 3.73 X10*6/UL (ref 4–5.2)
RH FACTOR (ANTIGEN D): NORMAL
SODIUM SERPL-SCNC: 138 MMOL/L (ref 136–145)
T3 SERPL-MCNC: 100 NG/DL (ref 60–200)
WBC # BLD AUTO: 5.9 X10*3/UL (ref 4.4–11.3)

## 2024-06-15 PROCEDURE — 86901 BLOOD TYPING SEROLOGIC RH(D): CPT | Performed by: INTERNAL MEDICINE

## 2024-06-15 PROCEDURE — 1200000002 HC GENERAL ROOM WITH TELEMETRY DAILY

## 2024-06-15 PROCEDURE — 97161 PT EVAL LOW COMPLEX 20 MIN: CPT | Mod: GP

## 2024-06-15 PROCEDURE — 96372 THER/PROPH/DIAG INJ SC/IM: CPT | Performed by: INTERNAL MEDICINE

## 2024-06-15 PROCEDURE — 99223 1ST HOSP IP/OBS HIGH 75: CPT | Performed by: PSYCHIATRY & NEUROLOGY

## 2024-06-15 PROCEDURE — 2500000004 HC RX 250 GENERAL PHARMACY W/ HCPCS (ALT 636 FOR OP/ED): Performed by: INTERNAL MEDICINE

## 2024-06-15 PROCEDURE — 36415 COLL VENOUS BLD VENIPUNCTURE: CPT | Performed by: INTERNAL MEDICINE

## 2024-06-15 PROCEDURE — 82947 ASSAY GLUCOSE BLOOD QUANT: CPT | Mod: 91

## 2024-06-15 PROCEDURE — 99232 SBSQ HOSP IP/OBS MODERATE 35: CPT | Performed by: INTERNAL MEDICINE

## 2024-06-15 PROCEDURE — 83735 ASSAY OF MAGNESIUM: CPT | Performed by: INTERNAL MEDICINE

## 2024-06-15 PROCEDURE — 2500000001 HC RX 250 WO HCPCS SELF ADMINISTERED DRUGS (ALT 637 FOR MEDICARE OP): Performed by: INTERNAL MEDICINE

## 2024-06-15 PROCEDURE — 97112 NEUROMUSCULAR REEDUCATION: CPT | Mod: GO

## 2024-06-15 PROCEDURE — 85027 COMPLETE CBC AUTOMATED: CPT | Performed by: INTERNAL MEDICINE

## 2024-06-15 PROCEDURE — 2500000002 HC RX 250 W HCPCS SELF ADMINISTERED DRUGS (ALT 637 FOR MEDICARE OP, ALT 636 FOR OP/ED): Performed by: INTERNAL MEDICINE

## 2024-06-15 PROCEDURE — 92523 SPEECH SOUND LANG COMPREHEN: CPT | Mod: GN | Performed by: SPEECH-LANGUAGE PATHOLOGIST

## 2024-06-15 PROCEDURE — 94760 N-INVAS EAR/PLS OXIMETRY 1: CPT | Mod: MUE

## 2024-06-15 PROCEDURE — 97165 OT EVAL LOW COMPLEX 30 MIN: CPT | Mod: GO

## 2024-06-15 PROCEDURE — 80048 BASIC METABOLIC PNL TOTAL CA: CPT | Performed by: INTERNAL MEDICINE

## 2024-06-15 RX ORDER — LATANOPROST 50 UG/ML
1 SOLUTION/ DROPS OPHTHALMIC NIGHTLY
Status: DISCONTINUED | OUTPATIENT
Start: 2024-06-15 | End: 2024-06-17 | Stop reason: HOSPADM

## 2024-06-15 RX ORDER — ACETAMINOPHEN 325 MG/1
650 TABLET ORAL EVERY 6 HOURS PRN
Status: DISCONTINUED | OUTPATIENT
Start: 2024-06-15 | End: 2024-06-17 | Stop reason: HOSPADM

## 2024-06-15 ASSESSMENT — COGNITIVE AND FUNCTIONAL STATUS - GENERAL
PERSONAL GROOMING: A LOT
WALKING IN HOSPITAL ROOM: TOTAL
WALKING IN HOSPITAL ROOM: TOTAL
TOILETING: TOTAL
HELP NEEDED FOR BATHING: A LOT
DRESSING REGULAR LOWER BODY CLOTHING: TOTAL
CLIMB 3 TO 5 STEPS WITH RAILING: TOTAL
DRESSING REGULAR UPPER BODY CLOTHING: A LOT
DRESSING REGULAR LOWER BODY CLOTHING: TOTAL
DAILY ACTIVITIY SCORE: 11
DRESSING REGULAR UPPER BODY CLOTHING: A LOT
STANDING UP FROM CHAIR USING ARMS: TOTAL
TOILETING: TOTAL
MOBILITY SCORE: 11
PERSONAL GROOMING: A LOT
CLIMB 3 TO 5 STEPS WITH RAILING: TOTAL
PERSONAL GROOMING: A LOT
MOVING TO AND FROM BED TO CHAIR: A LOT
EATING MEALS: A LITTLE
WALKING IN HOSPITAL ROOM: TOTAL
STANDING UP FROM CHAIR USING ARMS: A LOT
MOVING FROM LYING ON BACK TO SITTING ON SIDE OF FLAT BED WITH BEDRAILS: A LITTLE
DAILY ACTIVITIY SCORE: 11
MOVING TO AND FROM BED TO CHAIR: TOTAL
MOVING FROM LYING ON BACK TO SITTING ON SIDE OF FLAT BED WITH BEDRAILS: A LOT
HELP NEEDED FOR BATHING: A LOT
MOVING TO AND FROM BED TO CHAIR: A LOT
DRESSING REGULAR LOWER BODY CLOTHING: TOTAL
TURNING FROM BACK TO SIDE WHILE IN FLAT BAD: A LOT
MOBILITY SCORE: 12
EATING MEALS: A LITTLE
TOILETING: TOTAL
HELP NEEDED FOR BATHING: A LOT
TURNING FROM BACK TO SIDE WHILE IN FLAT BAD: A LITTLE
CLIMB 3 TO 5 STEPS WITH RAILING: TOTAL
MOBILITY SCORE: 9
EATING MEALS: A LITTLE
STANDING UP FROM CHAIR USING ARMS: TOTAL
DAILY ACTIVITIY SCORE: 11
DRESSING REGULAR UPPER BODY CLOTHING: A LOT
TURNING FROM BACK TO SIDE WHILE IN FLAT BAD: A LITTLE

## 2024-06-15 ASSESSMENT — ENCOUNTER SYMPTOMS
LIGHT-HEADEDNESS: 1
WEAKNESS: 1
FACIAL ASYMMETRY: 1

## 2024-06-15 ASSESSMENT — PAIN - FUNCTIONAL ASSESSMENT
PAIN_FUNCTIONAL_ASSESSMENT: 0-10
PAIN_FUNCTIONAL_ASSESSMENT: 0-10

## 2024-06-15 ASSESSMENT — ACTIVITIES OF DAILY LIVING (ADL)
BATHING_ASSISTANCE: MAXIMAL
LACK_OF_TRANSPORTATION: NO

## 2024-06-15 ASSESSMENT — PAIN SCALES - GENERAL
PAINLEVEL_OUTOF10: 0 - NO PAIN
PAINLEVEL_OUTOF10: 1
PAINLEVEL_OUTOF10: 0 - NO PAIN
PAINLEVEL_OUTOF10: 0 - NO PAIN

## 2024-06-15 ASSESSMENT — PAIN DESCRIPTION - LOCATION: LOCATION: HEAD

## 2024-06-15 NOTE — PROGRESS NOTES
Speech-Language Pathology    SLP Adult Inpatient Speech-Language Cognition    Patient Name: Niurka Parada  MRN: [id number]  Today's Date: 6/15/2024   Time Calculation  Start Time: 0817  Stop Time: 0833  Time Calculation (min): 16 min         Current Problem:   1. Symptoms of cerebrovascular accident        2. Gastrointestinal hemorrhage, unspecified gastrointestinal hemorrhage type        3. Acute ischemic stroke (Multi)  Transthoracic Echo (TTE) Complete    Transthoracic Echo (TTE) Complete    CANCELED: Transthoracic Echo (TTE) Complete    CANCELED: Transthoracic Echo (TTE) Complete      4. Cerebral infarction due to unspecified occlusion or stenosis of other cerebral artery (Multi)  Transthoracic Echo (TTE) Complete    Transthoracic Echo (TTE) Complete    CANCELED: Transthoracic Echo (TTE) Complete    CANCELED: Transthoracic Echo (TTE) Complete        SLP Assessment:  Pt presents with a minimal level of impairment with cognitive-linguistic functioning characterized by deficits in motor speech, oral motor functioning, and intelligibility..     Skilled SLP services are warranted in order to facilitate pt motor speech, oral motor functioning, and intelligibility.    SLP TX Intervention Outcomes: Goals Established   Prognosis: Good  Treatment Tolerance: Pt tolerated treatment well  Medical Staff Made Aware: Yes   Strengths: Cognition  Barriers: Medical status      SLP Plan:   Inpatient/Swing Bed or Outpatient: Inpatient  Treatment/Interventions: Skilled ST services   SLP Plan: Skilled SLP   SLP Frequency: 1x per week   Duration: 30 days   Discussed POC: Patient, Nursing   Discussed Risks/Benefits: Patient  Patient/Caregiver Agreeable: Yes    Goals:  Pt will demonstrate completion of lingual strength and ROM exercises.  Pt will demonstrate completion of labial strength and ROM exercises.      Subjective   Current Problem:  73F who presented with acute onset L sided facial droop, L sided weakness, slurred speech.      General Visit Information:   Chart Reviewed: Yes  Patient Class: Inpatient  Ordering Physician: Ja  Reason for Referral: aphasia, dysarthria  Referred By: Ja  Past Medical History Relevant to Rehab: Anxiety, Depression, Diabetes mellitus (Multi), and Hypertension.   Prior Level of Function: WFL  [employer] Diet: Regular/Thin  Current Diet : Regular/Thin  Prior to Session Communication: Nurse      Objective   Pain:  Pain Assessment: 0-10  Pain Score: 0  Diagnosis: minimal cognitive-linguistic impairment    SLP Outcome Measures:  MoCA scores as follows:  Orientation- 6/6  Naming- 3/3  Attention- 5/6  Language- 2/3  Abstraction- 2/2  Delayed Recall- 4/5  Visuospatial/Executive- 4/5    Score of 26/30 indicating skills WFL    Pt administered Problem Solving and Abstract Reasoning subtest of RIPA-2 with score of 28 out of 30 indicating skills WFL.    Inpatient:  Education Documentation  Pt educated on results and POC.  Education Comments  Pt reported understanding.    Recommendations:   Skilled ST services for cognitive-linguistic reeducation in order to facilitate pt motor speech and intelligibility.     Consultations/Referrals/Coordination of Services: N/A

## 2024-06-15 NOTE — PROGRESS NOTES
Physical Therapy    Physical Therapy Evaluation    Patient Name: Niurka Parada  MRN: [id number]  Today's Date: 6/15/2024   Time Calculation  Start Time: 1052  Stop Time: 1120  Time Calculation (min): 28 min  917/917-A    Assessment/Plan   PT Assessment  PT Assessment Results: Decreased strength, Decreased endurance, Impaired balance, Decreased mobility, Decreased coordination, Decreased safety awareness  Rehab Prognosis: Good  Strengths: Insight into problems, Ability to acquire knowledge  End of Session Communication: Bedside nurse, Physician, Care Coordinator  Assessment Comment: Pt presents with decreased functional mobility secondary to weakness, decreased coordination, decreased balance, and decreased activity tolerance. Pt will benefit from skilled PT to address above deficits.  End of Session Patient Position: Up in chair, Alarm on  IP OR SWING BED PT PLAN  Inpatient or Swing Bed: Inpatient  PT Plan  Treatment/Interventions: Bed mobility, Transfer training, Gait training, Stair training, Balance training, Neuromuscular re-education, Strengthening, Endurance training, Range of motion, Therapeutic exercise, Home exercise program, Therapeutic activity  PT Plan: Ongoing PT  PT Frequency: 5 times per week  PT Discharge Recommendations: High intensity level of continued care  PT Recommended Transfer Status: Assist x2 (SPT bed > chair only at time of eval; use gait belt)  PT - OK to Discharge: Yes (PT eval complete, ok to d/c once deemed medically appropriate to next level of care.)    Subjective     Current Problem:  1. Symptoms of cerebrovascular accident        2. Gastrointestinal hemorrhage, unspecified gastrointestinal hemorrhage type        3. Acute ischemic stroke (Multi)  Transthoracic Echo (TTE) Complete    Transthoracic Echo (TTE) Complete    CANCELED: Transthoracic Echo (TTE) Complete    CANCELED: Transthoracic Echo (TTE) Complete      4. Cerebral infarction due to unspecified occlusion or stenosis of  other cerebral artery (Multi)  Transthoracic Echo (TTE) Complete    Transthoracic Echo (TTE) Complete    CANCELED: Transthoracic Echo (TTE) Complete    CANCELED: Transthoracic Echo (TTE) Complete        Patient Active Problem List   Diagnosis    Symptoms of cerebrovascular accident (CVA)    Symptoms of cerebrovascular accident       General Visit Information:  General  Reason for Referral: stroke  Referred By: Ja  Past Medical History Relevant to Rehab: Includes: HTN, anxiety, CKD, DM, GERD, MDD, DLD, R TKA, caridad, glaucoma.  Co-Treatment: OT  Prior to Session Communication: Bedside nurse  Patient Position Received: Bed, 3 rail up, Alarm on  General Comment: To ED at 10:40 am on 6/14 with L sided weakness, numbness, facial droop that began at 2:30 am. Initial NIH 8. Outside of window for TNK.  CTA head and neck: (-) thrombus. CT head: possible L parietal hypoattenuating focus.  MRI: T2 signal changes R anterior medulla, suspicious for acute/subacute infarct.    Home Living:  Home Living  Home Living Comments: Pt. lives alone with 2 cats. No steps to enter. 1st floor set up, including laundry. No AD use, and does not own AD. Tub/shower, no seat or safety bars. Independent with ADL/IADLs. 3 falls with onset of symptoms prior to admission, no other prior falls. Drives. Is retired.    Prior Level of Function:  Prior Function Per Pt/Caregiver Report  Hand Dominance: Right    Precautions:  Precautions  Medical Precautions: Fall precautions (SUPPORT LUE AT ALL TIMES, BLOCK L HIP AND KNEE WITH WEIGHT BEARING)  Precautions Comment: Significant LUE and LLE weakness, GAIT BELT.    Vital Signs:     Objective     Pain:  Pain Assessment  Pain Assessment: 0-10  Pain Score: 0 - No pain    Cognition:  Cognition  Overall Cognitive Status: Within Functional Limits  Orientation Level: Oriented X4    General Assessments:         Sensation  Sensation Comment: Impaired sensation to LUE and LLE. LLE impaired at upper medial  thigh.  Strength  Strength Comments: R LE MMT 4+/5 overall. LLE MMT hip flex 0/5, knee flex 2-/5, knee ext 2-/5, ankle DF 0/5, PF 2-/5  Perception  Inattention/Neglect: Appears intact  Initiation: Appears intact  Perseveration: Not present  Coordination  Movements are Fluid and Coordinated: No  Lower Body Coordination: Significant LLE impairement  Trunk Coordination: Posterior losses of balance  Alternating Toe Taps:  (unable (LLE foot drop))  Heel to Rocha:  (unable (LLE weakness))  Postural Control  Postural Control:  (retropulsive)  Static Sitting Balance  Static Sitting-Comment/Number of Minutes: FAir  Dynamic Sitting Balance  Dynamic Sitting-Comments: Poor (retropulsive)       Functional Assessments:     Bed Mobility  Bed Mobility:  (Max assist sup to sit. Assist with trunk and LLE.)  Transfers  Transfer: Yes  Transfer 1  Trials/Comments 1:  (Max assist sit to stand at EOB with LUE supported and L hip/knee blocked. Max assist to scoot to EOB.)  Transfers 2  Trials/Comments 2: 2 max assist stand/squat pivot EOB to recliner with LUE supported and L hip/knee blocked.  Transfers 3  Trials/Comments 3: Max assist stand to sit at recliner with LUE supported and L hip/knee blocked, assist to control descent. Max assist to scoot to back of chair.  Ambulation/Gait Training  Ambulation/Gait Training Performed: No          Extremity/Trunk Assessments:        RLE   RLE : Within Functional Limits  LLE   LLE :  (PROM WFL; AROM limited by weakness)    Outcome Measures:     [facility] Basic Mobility  Turning from your back to your side while in a flat bed without using bedrails: A lot  Moving from lying on your back to sitting on the side of a flat bed without using bedrails: A lot  Moving to and from bed to chair (including a wheelchair): A lot  Standing up from a chair using your arms (e.g. wheelchair or bedside chair): Total  To walk in hospital room: Total  Climbing 3-5 steps with railing: Total  Basic Mobility - Total Score: 9    "                                     Goals:  Encounter Problems       Encounter Problems (Active)       PT Problem       Pt will demonstrate sup > sit and sit > sup bed mobility min A (Progressing)       Start:  06/15/24    Expected End:  06/29/24            Pt will demo sit > stand and stand > sit transfer with least restrictive device and min A  (Progressing)       Start:  06/15/24    Expected End:  06/29/24            Pt will demo stand pivot transfer with least restrictive device and min A  (Progressing)       Start:  06/15/24    Expected End:  06/29/24            Pt will perform dynamic reaching activities while sitting EOB 4\" outside of GARTH with min A for balance (Progressing)       Start:  06/15/24    Expected End:  06/29/24            Pt will ambulate 5' with least restrictive device and mod A, without LOB  (Progressing)       Start:  06/15/24    Expected End:  06/29/24                   Pain - Adult            Education Documentation  Mobility Training, taught by Keiry Kraft, PT at 6/15/2024  3:19 PM.  Learner: Patient  Readiness: Acceptance  Method: Explanation  Response: Verbalizes Understanding  Comment: Educated pt on POC and safety with assistive device          "

## 2024-06-15 NOTE — PROGRESS NOTES
06/15/24 1314   Discharge Planning   Living Arrangements Alone   Support Systems Family members   Assistance Needed independent with adls and iadls   Type of Residence Private residence   Home or Post Acute Services Post acute facilities (Rehab/SNF/etc)   Type of Post Acute Facility Services Rehab   Type of Home Care Services Home OT;Home PT   Patient expects to be discharged to: Rehab   Does the patient need discharge transport arranged? Yes   RoundTrip coordination needed? Yes   Financial Resource Strain   How hard is it for you to pay for the very basics like food, housing, medical care, and heating? Not hard   Housing Stability   In the last 12 months, was there a time when you were not able to pay the mortgage or rent on time? N   In the last 12 months, how many places have you lived? 1   In the last 12 months, was there a time when you did not have a steady place to sleep or slept in a shelter (including now)? N   Transportation Needs   In the past 12 months, has lack of transportation kept you from medical appointments or from getting medications? no   In the past 12 months, has lack of transportation kept you from meetings, work, or from getting things needed for daily living? No   Patient Choice   Provider Choice list and CMS website (https://medicare.gov/care-compare#search) for post-acute Quality and Resource Measure Data were provided and reviewed with: Patient     Chart reviewed, therapy evaluated and recommended high intensity therapy. Writer spoke with patient- introduced self and explained role.  She is alert and oriented x3. She interacted well with writer. Writer reviewed therapy recommendation and she is agreeable. List printed from careport provided. Preference is [facility]. Her brother was there after back surgery. Referral sent in care port/ await acceptance. Care Transition team to follow and assist as needed. Anu Velez, MAX    Update 6554 [facility] accepted.  MAX Ramos

## 2024-06-15 NOTE — PROGRESS NOTES
Occupational Therapy    Evaluation    Patient Name: Niurka Parada  MRN: [id number]  Today's Date: 6/15/2024  Time Calculation  Start Time: 1051  Stop Time: 1120  Time Calculation (min): 29 min    Assessment  IP OT Assessment  End of Session Communication: Bedside nurse, Physician, Care Coordinator  End of Session Patient Position: Up in chair, Alarm on (LEs elevated in recliner, LUE supported on pillow, wash cloth ball in hand, all needs in reach, ed on squeezing wash cloth ball and opening fingers (L hand).)  Plan:  Treatment Interventions: ADL retraining, Functional transfer training, UE strengthening/ROM, Endurance training, Patient/family training, Equipment evaluation/education, Neuromuscular reeducation, Fine motor coordination activities, Compensatory technique education  OT Frequency: 4 times per week  OT Discharge Recommendations: High intensity level of continued care  OT - OK to Discharge: Yes (when medically appropriate)    Subjective   Current Problem:  1. Symptoms of cerebrovascular accident        2. Gastrointestinal hemorrhage, unspecified gastrointestinal hemorrhage type        3. Acute ischemic stroke (Multi)  Transthoracic Echo (TTE) Complete    Transthoracic Echo (TTE) Complete    CANCELED: Transthoracic Echo (TTE) Complete    CANCELED: Transthoracic Echo (TTE) Complete      4. Cerebral infarction due to unspecified occlusion or stenosis of other cerebral artery (Multi)  Transthoracic Echo (TTE) Complete    Transthoracic Echo (TTE) Complete    CANCELED: Transthoracic Echo (TTE) Complete    CANCELED: Transthoracic Echo (TTE) Complete        General:  General  Reason for Referral: Stroke  Referred By: Ja  Past Medical History Relevant to Rehab: Includes: HTN, anxiety, CKD, DM, GERD, MDD, DLD, R TKA, caridad, glaucoma.  Co-Treatment: PT  Prior to Session Communication: Bedside nurse  Patient Position Received: Bed, 3 rail up, Alarm on  General Comment: To ED at 10:40 am on 6/14 with L sided  weakness, numbness, facial droop that began at 2:30 am. Initial NIH 8. Outside of window for TNK.  CTA head and neck: (-) thrombus. CT head: possible L parietal hypoattenuating focus.  MRI: T2 signal changes R anterior medulla, suspicious for acute/subacute infarct.  Precautions:  Medical Precautions: Fall precautions (SUPPORT LUE AT ALL TIMES, BLOCK L HIP AND KNEE WITH WEIGHT BEARING)  Precautions Comment: Significant LUE and LLE weakness, GAIT BELT.  Vital Signs:     Pain:  Pain Assessment  Pain Assessment: 0-10  Pain Score: 0 - No pain    Objective   Cognition:  Overall Cognitive Status: Within Functional Limits  Orientation Level: Oriented X4           Home Living:  Home Living Comments: Pt. lives alone with 2 cats.  No steps to enter.  1st floor set up, including laundry.  No AD use, and does not own AD.  Tub/shower, no seat or safety bars.  Independent with ADL/IADLs.  3 falls with onset of symptoms prior to admission, no other prior falls.  Drives.  Is retired.   Prior Function:  Hand Dominance: Right  IADL History:     ADL:  Eating Assistance: Minimal  Grooming Assistance: Moderate  Bathing Assistance: Maximal  UE Dressing Assistance: Maximal  LE Dressing Assistance: Total  Toileting Assistance with Device: Total  Functional Assistance: Total  Activity Tolerance:     Bed Mobility/Transfers: Bed Mobility  Bed Mobility:  (Max assist sup to sit.  Assist with trunk and LLE.)    Transfers  Transfer: Yes  Transfer 1  Trials/Comments 1: Max assist sit to stand at EOB with LUE supported and L hip/knee blocked.  Max assist to scoot to EOB.  Transfers 2  Trials/Comments 2: 2 max assist stand/squat pivot EOB to recliner with LUE supported and L hip/knee blocked.  Transfers 3  Trials/Comments 3: Max assist stand to sit at recliner with LUE supported and L hip/knee blocked, assist to control descent.  Max assist to scoot to back of chair.    Sitting Balance:  Static Sitting Balance  Static Sitting-Comment/Number of  Minutes: Fair to poor (+)  Dynamic Sitting Balance  Dynamic Sitting-Comments: Poor, significant posterior losses of balance, requiring assistance to correct  Standing Balance:  Static Standing Balance  Static Standing-Comment/Number of Minutes: Poor  Dynamic Standing Balance  Dynamic Standing-Comments: Poor       Vision: Vision - Basic Assessment  Patient Visual Report:  (Pt. reports no changes in vision.  No apparent deficits.)   and    Sensation:  Sensation Comment: Impaired sensation to LUE and LLE.  Strength:  Strength Comments: L shoulder 3-/5. L elbow 3-/5. L wrist 2-/5.  L hand 2-/5.  RUE 5/5. (Significant LLE weakness and foot drop.)  Perception:  Inattention/Neglect: Appears intact  Initiation: Appears intact  Perseveration: Not present  Coordination:  Movements are Fluid and Coordinated: No  Upper Body Coordination: Significant LUE impairement  Lower Body Coordination: Significant LLE impairement  Trunk Coordination: Posterior losses of balance  Finger to Nose:  (RUE intact, LUE unable)  Finger to Target:  (RUE intact, LUE unable)  Coordination Comment: LUE FMC and GMC significantly impaired   Hand Function:  Hand Function  Gross Grasp:  (L impaired)  Coordination:  (LUE and LLE impaired)  Extremities: RUE   RUE : Within Functional Limits and LUE   LUE:  (AROM limited by weakness.  PROM WFL.)    Outcome Measures: [facility] Daily Activity  Putting on and taking off regular lower body clothing: Total  Bathing (including washing, rinsing, drying): A lot  Putting on and taking off regular upper body clothing: A lot  Toileting, which includes using toilet, bedpan or urinal: Total  Taking care of personal grooming such as brushing teeth: A lot  Eating Meals: A little  Daily Activity - Total Score: 11      Education Documentation  Body Mechanics, taught by Wendi Williamson OT at 6/15/2024 12:16 PM.  Learner: Patient  Readiness: Acceptance  Method: Explanation  Response: Verbalizes Understanding, Needs  Reinforcement    Precautions, taught by Wendi Williamson OT at 6/15/2024 12:16 PM.  Learner: Patient  Readiness: Acceptance  Method: Explanation  Response: Verbalizes Understanding, Needs Reinforcement    ADL Training, taught by Wendi Williamson OT at 6/15/2024 12:16 PM.  Learner: Patient  Readiness: Acceptance  Method: Explanation  Response: Verbalizes Understanding, Needs Reinforcement    Education Comments  LUE management and positioning during bed mobility and transfers, LUE positioning and management in recliner, encouraged to engage LUE in all functional tasks as able.       Goals:   Encounter Problems       Encounter Problems (Active)       OT Goals       Fair dynamic sitting balance for ADL.        Start:  06/15/24    Expected End:  06/29/24            Poor (+) dynamic standing balance for ADL.        Start:  06/15/24    Expected End:  06/29/24            LUE MMT 3+/5 for ADL and functional mobility.        Start:  06/15/24    Expected End:  06/29/24            Mod assist sit/stand, bed/chair commode transfers.        Start:  06/15/24    Expected End:  06/29/24            Use LUE as functional assistant for all functional tasks, without cues.        Start:  06/15/24    Expected End:  06/29/24

## 2024-06-15 NOTE — CONSULTS
History Of Present Illness  Niurka Parada is a 73 y.o. female presenting with left facial droop left-sided upper and lower extremity weakness.  According the patient the symptoms started several hours ago and she woke up in the middle of the night and not feeling well and then she went back to bed and early morning yesterday she noticed that she had some facial weakness and left upper and lower extremities.  The time she called her daughter who is a nurse and came straight to the emergency room.  She was out for the window for TNK.  She had a CAT scan which was unremarkable and showed some area of infarction which were not clinically correlated.  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      Past Medical History  Past Medical History:   Diagnosis Date    Anxiety     Depression     Diabetes mellitus (Multi)     Hypertension      Surgical History  Past Surgical History:   Procedure Laterality Date    CHOLECYSTECTOMY      TOTAL KNEE ARTHROPLASTY Right     TUBAL LIGATION       Social History  Social History     Tobacco Use    Smoking status: Never    Smokeless tobacco: Never   Vaping Use    Vaping status: Never Used   Substance Use Topics    Alcohol use: Not Currently    Drug use: Never     Allergies  Morphine  Home Medications  Medications Prior to Admission   Medication Sig Dispense Refill Last Dose    ALPRAZolam (Xanax) 0.5 mg tablet Take 1 tablet (0.5 mg) by mouth 3 times a day.   6/14/2024    latanoprost (Xalatan) 0.005 % ophthalmic solution Administer 1 drop into  both eyes once daily at bedtime.   6/13/2024    meloxicam (Mobic) 15 mg tablet Take 1 tablet (15 mg) by mouth once daily as needed.   Past Week    metFORMIN (Glucophage) 1,000 mg tablet Take 1 tablet (1,000 mg) by mouth once daily with breakfast.   6/13/2024    metoprolol succinate XL (Toprol-XL) 50 mg 24 hr tablet Take 1 tablet (50 mg) by mouth once daily.   6/14/2024    naproxen (Naprosyn) 250 mg tablet Take 2 tablets (500 mg) by mouth every 6 hours if needed for mild pain (1 - 3).   6/13/2024    PARoxetine (Paxil) 40 mg tablet Take 1 tablet (40 mg) by mouth once daily.   6/13/2024    pseudoephedrine (Sudafed) 30 mg tablet Take 1 tablet (30 mg) by mouth 2 times a day as needed for congestion.   6/13/2024    ergocalciferol (Vitamin D-2) 1.25 MG (63464 UT) capsule Take 1 capsule (1,250 mcg) by mouth 1 (one) time per week. On Sunday 6/9/2024       Review of Systems   Neurological:  Positive for facial asymmetry, weakness and light-headedness.       Physical Exam  Shows mildly obese.  HEENT respiratory to be equal and react light ear nose and throat was unremarkable and left-sided facial droop    Neck was supple without any carotid bruits and without any lymphadenopathy    Cardiovascular examination normal S1-S2 with clear breath sounds bilaterally abdomen soft distended difficult to palpate with organomegaly    Extremities I did not reveal edema or any rash.  Neurological Exam  Patient was alert awake very pleasant and cooperative she was at her place person and time.  Speech was slightly dysarthric but she was able to follow simple commands.  Memory was intact.  Attention span judgment concentration and focus was appropriate    No hallucinations or delusions.    Cranial revealed Cellosene with no positive the left side.  Extraocular motor intact had slight facial asymmetry and is from the palpebral fissure.  Tongue was in the midline and she was able to elevate the palate and shoulder without difficulties sensory  "exam was intact to light touch and pinprick and her hearing was normal    Motor exam revealed left hemiparesis with a power of 1-2 or 5 in the upper extremity and 2-3 or 5 in the left lower extremity    Reflexes were asymmetric slightly increased reflexes on the left compared to the right with plantar extensor response bilaterally    Coordination Station and gait were deferred and she was wheelchair-bound    Sensory exam was intact to light touch and pinprick.      Last Recorded Vitals  Blood pressure 126/59, pulse 69, temperature 35.8 °C (96.4 °F), resp. rate 18, height 1.676 m (5' 6\"), weight 88 kg (194 lb 0.1 oz), SpO2 96%.      Relevant Results  Recent Results (from the past 24 hour(s))   POCT GLUCOSE    Collection Time: 06/14/24  4:21 PM   Result Value Ref Range    POCT Glucose 119 (H) 74 - 99 mg/dL   POCT GLUCOSE    Collection Time: 06/14/24  7:57 PM   Result Value Ref Range    POCT Glucose 149 (H) 74 - 99 mg/dL   SST TOP    Collection Time: 06/15/24  5:43 AM   Result Value Ref Range    Extra Tube Hold for add-ons.    Magnesium    Collection Time: 06/15/24  5:44 AM   Result Value Ref Range    Magnesium 1.87 1.60 - 2.40 mg/dL   Basic Metabolic Panel    Collection Time: 06/15/24  5:44 AM   Result Value Ref Range    Glucose 124 (H) 74 - 99 mg/dL    Sodium 138 136 - 145 mmol/L    Potassium 3.8 3.5 - 5.3 mmol/L    Chloride 105 98 - 107 mmol/L    Bicarbonate 25 21 - 32 mmol/L    Anion Gap 12 10 - 20 mmol/L    Urea Nitrogen 17 6 - 23 mg/dL    Creatinine 1.06 (H) 0.50 - 1.05 mg/dL    eGFR 56 (L) >60 mL/min/1.73m*2    Calcium 9.1 8.6 - 10.3 mg/dL   CBC    Collection Time: 06/15/24  5:44 AM   Result Value Ref Range    WBC 5.9 4.4 - 11.3 x10*3/uL    nRBC 0.0 0.0 - 0.0 /100 WBCs    RBC 3.73 (L) 4.00 - 5.20 x10*6/uL    Hemoglobin 8.6 (L) 12.0 - 16.0 g/dL    Hematocrit 29.9 (L) 36.0 - 46.0 %    MCV 80 80 - 100 fL    MCH 23.1 (L) 26.0 - 34.0 pg    MCHC 28.8 (L) 32.0 - 36.0 g/dL    RDW 16.4 (H) 11.5 - 14.5 %    Platelets 227 " 150 - 450 x10*3/uL   Type And Screen    Collection Time: 06/15/24  5:44 AM   Result Value Ref Range    ABO TYPE O     Rh TYPE POS     ANTIBODY SCREEN NEG    POCT GLUCOSE    Collection Time: 06/15/24  6:10 AM   Result Value Ref Range    POCT Glucose 136 (H) 74 - 99 mg/dL   POCT GLUCOSE    Collection Time: 06/15/24 12:25 PM   Result Value Ref Range    POCT Glucose 147 (H) 74 - 99 mg/dL      NIH Stroke Scale  1A. Level of Consciousness: Alert, Keenly Responsive  1B. Ask Month and Age: Both Questions Right  1C. Blink Eyes & Squeeze Hands: Performs Both Tasks  2. Best Gaze: Normal  3. Visual: No Visual Loss  4. Facial Palsy: Minor Paralysis  5A. Motor - Left Arm: Some Effort Against Gravity  5B. Motor - Right Arm: No Drift  6A. Motor - Left Leg: Some Effort Against Gravity  6B. Motor - Right Leg: No Drift  7. Limb Ataxia: Absent  8. Sensory Loss: Mild-to-Moderate Sensory Loss  9. Best Language: Mild-to-Moderate Aphasia  10. Dysarthria: Mild-to-Moderate Dysarthria  11. Extinction and Inattention: No Abnormality  NIH Stroke Scale: 8           [place] Coma Scale  Best Eye Response: Spontaneous  Best Verbal Response: Oriented  Best Motor Response: Follows commands  [place] Coma Scale Score: 15              CMP:    Results from last 7 days   Lab Units 06/15/24  0544 06/14/24  1047   SODIUM mmol/L 138 137   POTASSIUM mmol/L 3.8 4.3   CHLORIDE mmol/L 105 103   CO2 mmol/L 25 25   BUN mg/dL 17 18   CREATININE mg/dL 1.06* 1.20*   GLUCOSE mg/dL 124* 148*   PROTEIN TOTAL g/dL  --  6.5   CALCIUM mg/dL 9.1 9.2   BILIRUBIN TOTAL mg/dL  --  0.4   ALK PHOS U/L  --  111   AST U/L  --  18   ALT U/L  --  15       Lipid Panel:  Results from last 7 days   Lab Units 06/14/24  1048   HDL mg/dL 44.0   CHOLESTEROL/HDL RATIO  4.5   VLDL mg/dL 47*   TRIGLYCERIDES mg/dL 234*   NON HDL CHOL. mg/dL 153*            MR brain wo IV contrast    Result Date: 6/14/2024  Interpreted By:  Edson Dela Cruz, STUDY: MR BRAIN WO IV CONTRAST;  6/14/2024 6:28  pm   INDICATION: Signs/Symptoms:L sided upper and lower extremity weakness, L facial droop, dysarthria.   COMPARISON: None.   ACCESSION NUMBER(S): [id number]   ORDERING CLINICIAN: PEPE ANTHONY   TECHNIQUE: Axial T2, FLAIR, DWI, gradient echo T2 and sagittal and coronal T1 weighted images of brain were acquired.   FINDINGS: Subtle focus of diffusion restriction is present in the right anterior medulla (series 7, image 9) suspicious for a small infarct. Slight associated T2 hyperintense signal is present, likely representing mild vasogenic edema.   No additional areas of diffusion restriction are identified intracranially. No supratentorial infarcts are identified.   There is no evidence of intracranial hemorrhage. No mass effect or midline shift is present. No pathologic hemosiderin staining is identified.   No ventricular dilatation is present. Basal cisterns are patent. No extra-axial fluid collections are identified.   Visualized large arterial flow voids are preserved, including intracranial carotid and basilar arteries.   Scattered foci of hyperintense FLAIR and T2 signal changes are present in the periventricular and subcortical white matter of bilateral cerebral hemispheres, nonspecific findings favored to represent chronic sequela of microvascular disease.   Small amount of T2 signal is present in the right mastoid air cells. Paranasal sinuses do not demonstrate any abnormal signal abnormalities.       1. Small focus of diffusion restriction with associated T2 signal changes in the right anterior medulla suspicious for an acute to early subacute infarct. No additional areas of infarction are identified intracranially. 2. Scattered areas of hyperintense FLAIR and T2 signal present in the periventricular and subcortical white matter of bilateral cerebral hemispheres are nonspecific, but favored to represent chronic sequela of microvascular disease.   MACRO: None   Signed by: Edson Dela Cruz 6/14/2024  "7:41 PM Dictation workstation:   [id number]   No CT head results found for the past 14 days  No echocardiogram results found for the past 14 days      Results from last 7 days   Lab Units 06/14/24  1048   HEMOGLOBIN A1C % 6.4*     No results found for: \"BNP\"     I have personally reviewed the following imaging results MR brain wo IV contrast    Result Date: 6/14/2024  Interpreted By:  Edson Dela Cruz, STUDY: MR BRAIN WO IV CONTRAST;  6/14/2024 6:28 pm   INDICATION: Signs/Symptoms:L sided upper and lower extremity weakness, L facial droop, dysarthria.   COMPARISON: None.   ACCESSION NUMBER(S): [id number]   ORDERING CLINICIAN: PEPE ANTHONY   TECHNIQUE: Axial T2, FLAIR, DWI, gradient echo T2 and sagittal and coronal T1 weighted images of brain were acquired.   FINDINGS: Subtle focus of diffusion restriction is present in the right anterior medulla (series 7, image 9) suspicious for a small infarct. Slight associated T2 hyperintense signal is present, likely representing mild vasogenic edema.   No additional areas of diffusion restriction are identified intracranially. No supratentorial infarcts are identified.   There is no evidence of intracranial hemorrhage. No mass effect or midline shift is present. No pathologic hemosiderin staining is identified.   No ventricular dilatation is present. Basal cisterns are patent. No extra-axial fluid collections are identified.   Visualized large arterial flow voids are preserved, including intracranial carotid and basilar arteries.   Scattered foci of hyperintense FLAIR and T2 signal changes are present in the periventricular and subcortical white matter of bilateral cerebral hemispheres, nonspecific findings favored to represent chronic sequela of microvascular disease.   Small amount of T2 signal is present in the right mastoid air cells. Paranasal sinuses do not demonstrate any abnormal signal abnormalities.       1. Small focus of diffusion restriction with " associated T2 signal changes in the right anterior medulla suspicious for an acute to early subacute infarct. No additional areas of infarction are identified intracranially. 2. Scattered areas of hyperintense FLAIR and T2 signal present in the periventricular and subcortical white matter of bilateral cerebral hemispheres are nonspecific, but favored to represent chronic sequela of microvascular disease.   MACRO: None   Signed by: Edson Dela Cruz 6/14/2024 7:41 PM Dictation workstation:   [id number]    CT brain attack head wo IV contrast    Result Date: 6/14/2024  Interpreted By:  Rayshawn Blandon, STUDY: CT BRAIN ATTACK HEAD WO IV CONTRAST;  6/14/2024 10:49 am   INDICATION: Signs/Symptoms:Stroke Evaluation.   COMPARISON: None.   ACCESSION NUMBER(S): [id number]   ORDERING CLINICIAN: ARDEN DELACRUZ   TECHNIQUE: Noncontrast axial CT scan of head was performed. Angled reformats in brain and bone windows and coronal and sagittal reconstructions were generated. The images were reviewed in bone, brain, blood and soft tissue windows.   FINDINGS: CSF Spaces: The ventricles, sulci and basal cisterns are within normal limits the patient's age. There is no extraaxial fluid collection.   Parenchyma: A roughly 2 x 1 x 1 cm mildly hypoattenuating focus in the left parietal white matter could be due to microangiopathy or an infarct of uncertain age, but does not correlate with the history of left-sided weakness.It could be further evaluated with MRI including diffusion-weighted imaging if clinically indicated. The grey-white differentiation is intact. There is no midline shift, other mass effect or intracranial hemorrhage. Moderate bilateral carotid siphon and minimal distal right vertebral artery calcification is noted.   Calvarium and bone: The calvarium is unremarkable. Edentulous.   Paranasal sinuses and mastoids: The mastoid air cells and tympanic cavities are well developed and clear bilaterally. The visualized  left maxillary sinus is hypoplastic and opacified with dense contents and bony wall thickening. This could be more completely evaluated with nonemergent sinus CT. An opacified left ethmoid air cell is unlikely to be clinically significant. The frontal sinus is not developed.       Left parietal white matter hypoattenuating focus as discussed above.   No evidence of intracranial hemorrhage or displaced skull fracture.   Hypoplastic opacified left maxillary sinus.   MACRO: Rayshawn Blandon MD discussed the significance and urgency of this critical finding by telephone with  ARDEN DELACRUZ on 6/14/2024 at 11:20 am.  (**-RCF-**) Findings:  See findings.     Signed by: Rayshawn Blandon 6/14/2024 11:22 AM Dictation workstation:   [id number]    ECG 12 lead    Result Date: 6/14/2024  Sinus rhythm with frequent Premature ventricular complexes Moderate voltage criteria for LVH, may be normal variant Cannot rule out Septal infarct , age undetermined Abnormal ECG No previous ECGs available See ED provider note for full interpretation and clinical correlation    CT brain attack angio head and neck W and WO IV contrast    Result Date: 6/14/2024  Interpreted By:  Frank Raman, STUDY: CT BRAIN ATTACK ANGIO HEAD AND NECK W AND WO IV CONTRAST; ; 6/14/2024 10:56 am   INDICATION: Signs/Symptoms:left side weakness, facial droop.   COMPARISON: None.   ACCESSION NUMBER(S): [id number]   ORDERING CLINICIAN: ARDEN DELACRUZ   TECHNIQUE: Thin cut axial CT images were generated over the head prior to and during the arterial passage of a full contrast bolus.  The bolus was generated with a power injector and followed with immediate saline flush. These image data were subtracted and then used for 3-D reconstructions. Maximum intensity projections and shaded surface displays were generated in multiple planes. In addition images were transferred into a 3-D processing program and additional projections and displays were reviewed by the  interpreting physician.   FINDINGS: The CT angiogram through the upper thorax demonstrates mild atherosclerotic calcifications along the aortic arch and origins of the right brachiocephalic artery and left subclavian artery. No significant stenosis noted along the origins of the right brachiocephalic artery, left common carotid, or left subclavian artery. No significant stenosis is noted along the origins of the vertebral arteries.   The CT images of the neck demonstrates atherosclerotic calcifications along the origin/proximal internal carotid arteries bilaterally contributing to mild non hemodynamically significant narrowing utilizing the more distal cervical segments of the internal carotid arteries as a point of reference. No significant stenosis is noted along the cervical segments of the vertebral arteries. There is a left dominant vertebral artery.   The CT angiogram of the head demonstrates a small caliber distal right vertebral artery which terminates in the right posterior inferior cerebellar artery. No significant stenosis is noted along the distal left dominant vertebral artery or basilar artery. Atherosclerotic calcifications are noted along the bilateral carotid siphons contributing to mild non hemodynamically significant narrowing. There is a hypoplastic A1 segment of the right anterior cerebral artery with the more distal right anterior cerebral artery supplied from the left via a patent anterior communicating artery. Patent posterior communicating arteries are identified bilaterally. No large vessel branch cutoffs of the anterior cerebral arteries, middle cerebral arteries, or posterior cerebral arteries are noted. No intracranial aneurysm is noted.   The source CT angiogram images of the head demonstrate mild to moderate brain parenchymal volume loss. There are nonspecific white matter changes noted within cerebral hemispheres bilaterally which while nonspecific, given the patient's age, may  represent sequelae of small-vessel ischemic change. There is no midline shift. There is opacification of an asymmetrically hypoplastic left maxillary sinus as well as opacification of a few left ethmoid air cells.   The source CT angiogram images of the neck demonstrate multilevel cervical spondylosis most pronounced at the C5/6 and C6/7 levels.         The CT angiogram through the upper thorax demonstrates mild atherosclerotic calcifications along the aortic arch and origins of the right brachiocephalic artery and left subclavian artery. No significant stenosis noted along the origins of the right brachiocephalic artery, left common carotid, or left subclavian artery. No significant stenosis is noted along the origins of the vertebral arteries.   The CT images of the neck demonstrates atherosclerotic calcifications along the origin/proximal internal carotid arteries bilaterally contributing to mild non hemodynamically significant narrowing utilizing the more distal cervical segments of the internal carotid arteries as a point of reference. No significant stenosis is noted along the cervical segments of the vertebral arteries. There is a left dominant vertebral artery.   The CT angiogram of the head demonstrates a small caliber distal right vertebral artery which terminates in the right posterior inferior cerebellar artery. No significant stenosis is noted along the distal left dominant vertebral artery or basilar artery. Atherosclerotic calcifications are noted along the bilateral carotid siphons contributing to mild non hemodynamically significant narrowing. There is a hypoplastic A1 segment of the right anterior cerebral artery with the more distal right anterior cerebral artery supplied from the left via a patent anterior communicating artery. Patent posterior communicating arteries are identified bilaterally. No large vessel branch cutoffs of the anterior cerebral arteries, middle cerebral arteries, or  posterior cerebral arteries are noted. No intracranial aneurysm is noted.   The source CT angiogram images of the head demonstrate mild to moderate brain parenchymal volume loss. There are nonspecific white matter changes noted within cerebral hemispheres bilaterally which while nonspecific, given the patient's age, may represent sequelae of small-vessel ischemic change.   The source CT angiogram images of the neck demonstrate multilevel cervical spondylosis most pronounced at the C5/6 and C6/7 levels.   MACRO: None   Signed by: Frank Raman 6/14/2024 11:11 AM Dictation workstation:   [id number]  .            Assessment/Plan   1.  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    2.  Diabetes mellitus which is well-controlled.    3.  Hypertension    4.  Morbid obesity.    Plan.  Continue with aspirin which was started earlier and will hold off on Plavix since patient has low hemoglobin and with Hemoccult blood.  Patient will need a GI workup and when medically stable patient can start Plavix along with aspirin for dual antiplatelet medication.  Will defer that to up to GI and further workup.  Signs symptoms a risk factor for CVA bleeding risk and the precaution and the role of medication were discussed with patient's  two daughter who is a pharmacist and a nurse.  Continue with PT OT evaluation and social service looking for disposition    Due to technical limitations of voice recognition and human error, this note may not accurately reflect the care of the patient.          NIHSS (worst at presentation):     Vascular Risk Factor modification goals:  Blood pressure goals: avoid hypotension SBP <100 that could worsen cerebral perfusion,   Lipid Goals: education on healthy diet and   Glucose Goals: early treatment of hyperglycemia to goal glucose 140-180 mg/dl with long-term goal A1c < 7%   Smoking Cessation and Education  Assessment for Rehabilitation  needs   Patient and family education on signs and symptoms of stroke, calling 911, healthy strategies for stroke prevention.           Due to technical limitations of voice recognition and human error, this note may not accurately reflect the care of the patient.   Dennys Buchanan MD

## 2024-06-15 NOTE — PROGRESS NOTES
ASSESSMENT & PLAN:     Acute R medullary ischemic stroke   -p/w L sided UE and LE weakness, numbness, L facial droop, slurred speech  -was out of window for thrombolytic therapy  -CT head noncon showed possible L parietal hypo-attenuating focus, although does not correlate with her presenting symptoms  -MRI brain done, showed acute infarct in R anterior medulla  -CTA H+N neg for LVO  -EKG did not show Afib, no Afib on tele  -A1C, lipid panel reviewed  Plan:  -Neuro consult. ASA 81, high intensity statin. Will avoid DAPT due to NIHSS >5 and iron deficiency anemia. Check TTE. Tele monitoring. Permissive HTN for now.      Iron deficiency anemia - Hgb 9.2 on admission, previous baseline in 2023 was normal Hgb. No overt bleeding. Reportedly hemooccult positive in ED. Iron studies show deficiency. Hgb 8.6 today. Would not scope here inpatient anyway given acute ischemic stroke. Will need outpt fu with GI. Will give IV iron here.   HTN - hold home metoprolol, permissive HTN in s/o above for now  HLD - lipid panel reviewed, high intensity statin  DM2 - A1C 6.4%. Accuchecks, ssi for now. Hold home metformin.   CKD3 - Scr at baseline, monitor  MDD - home paxil  Anxiety - home Xanax PRN  Vte ppx sqh  Full code, discussed with pt  Dispo tbd, pending pt/ot josé Peres MD    SUBJECTIVE     NAEON. Still has LUE and LLE weakness, L facial droop. L sided weakness a bit worse than yesterday.     OBJECTIVE:       Last Recorded Vitals:  Vitals:    06/14/24 2345 06/15/24 0048 06/15/24 0450 06/15/24 0804   BP: 117/65 116/57 131/62 126/59   BP Location:       Patient Position:       Pulse: 64 66 62 69   Resp: 16 17  18   Temp: 36.8 °C (98.2 °F) 36.5 °C (97.7 °F) 36.3 °C (97.3 °F) 35.8 °C (96.4 °F)   TempSrc: Temporal  Temporal    SpO2: 96% 94% 95% 96%   Weight:       Height:           Last I/O:  I/O last 3 completed shifts:  In: 1000 (11.4 mL/kg) [IV Piggyback:1000]  Out: - (0 mL/kg)   Weight: 88 kg     Physical Exam:  GEN:  appears stated age, NAD  CV: RRR, no m/r/g, no LE edema  LUNGS: CTAB, no w/r/c  ABD: soft, NT, obese  SKIN: no rashes  MSK; no gross deformities, normal joints  NEURO: A+Ox3, L facial droop, LUE and LLE 3/5 strength, sensation to light touch dull throughout shawn and lle  PSYCH: appropriate mood, affect    Inpatient Medications:  aspirin, 81 mg, oral, Daily  atorvastatin, 40 mg, oral, Nightly  ferumoxytol, 510 mg, intravenous, Once  heparin (porcine), 5,000 Units, subcutaneous, q8h  insulin lispro, 0-10 Units, subcutaneous, TID  levothyroxine, 50 mcg, oral, Daily  PARoxetine, 40 mg, oral, Daily  perflutren lipid microspheres, 0.5-10 mL of dilution, intravenous, Once in imaging  perflutren protein A microsphere, 0.5 mL, intravenous, Once in imaging  sulfur hexafluoride microsphr, 2 mL, intravenous, Once in imaging        PRN Medications  PRN medications: acetaminophen, ALPRAZolam, dextrose, dextrose, glucagon, glucagon, hydrALAZINE **FOLLOWED BY** [START ON 6/16/2024] hydrALAZINE, labetaloL, ondansetron, oxygen    Continuous Medications:         LABS AND IMAGING:     Labs:  Results for orders placed or performed during the hospital encounter of 06/14/24 (from the past 24 hour(s))   CBC and Auto Differential   Result Value Ref Range    WBC 5.7 4.4 - 11.3 x10*3/uL    nRBC 0.0 0.0 - 0.0 /100 WBCs    RBC 4.02 4.00 - 5.20 x10*6/uL    Hemoglobin 9.2 (L) 12.0 - 16.0 g/dL    Hematocrit 31.9 (L) 36.0 - 46.0 %    MCV 79 (L) 80 - 100 fL    MCH 22.9 (L) 26.0 - 34.0 pg    MCHC 28.8 (L) 32.0 - 36.0 g/dL    RDW 16.1 (H) 11.5 - 14.5 %    Platelets 262 150 - 450 x10*3/uL    Neutrophils % 49.7 40.0 - 80.0 %    Immature Granulocytes %, Automated 0.2 0.0 - 0.9 %    Lymphocytes % 38.5 13.0 - 44.0 %    Monocytes % 6.5 2.0 - 10.0 %    Eosinophils % 4.2 0.0 - 6.0 %    Basophils % 0.9 0.0 - 2.0 %    Neutrophils Absolute 2.81 1.60 - 5.50 x10*3/uL    Immature Granulocytes Absolute, Automated 0.01 0.00 - 0.50 x10*3/uL    Lymphocytes Absolute 2.18  0.80 - 3.00 x10*3/uL    Monocytes Absolute 0.37 0.05 - 0.80 x10*3/uL    Eosinophils Absolute 0.24 0.00 - 0.40 x10*3/uL    Basophils Absolute 0.05 0.00 - 0.10 x10*3/uL   Comprehensive metabolic panel   Result Value Ref Range    Glucose 148 (H) 74 - 99 mg/dL    Sodium 137 136 - 145 mmol/L    Potassium 4.3 3.5 - 5.3 mmol/L    Chloride 103 98 - 107 mmol/L    Bicarbonate 25 21 - 32 mmol/L    Anion Gap 13 10 - 20 mmol/L    Urea Nitrogen 18 6 - 23 mg/dL    Creatinine 1.20 (H) 0.50 - 1.05 mg/dL    eGFR 48 (L) >60 mL/min/1.73m*2    Calcium 9.2 8.6 - 10.3 mg/dL    Albumin 4.2 3.4 - 5.0 g/dL    Alkaline Phosphatase 111 33 - 136 U/L    Total Protein 6.5 6.4 - 8.2 g/dL    AST 18 9 - 39 U/L    Bilirubin, Total 0.4 0.0 - 1.2 mg/dL    ALT 15 7 - 45 U/L   Troponin I, High Sensitivity   Result Value Ref Range    Troponin I, High Sensitivity 5 0 - 13 ng/L   Protime-INR   Result Value Ref Range    Protime 11.4 9.8 - 12.8 seconds    INR 1.0 0.9 - 1.1   APTT   Result Value Ref Range    aPTT 29 27 - 38 seconds   Reticulocytes   Result Value Ref Range    Retic % 2.4 (H) 0.5 - 2.0 %    Retic Absolute 0.095 0.017 - 0.110 x10*6/uL    Reticulocyte Hemoglobin 21 (L) 28 - 38 pg    Immature Retic fraction 33.3 (H) <=16.0 %   Green Top   Result Value Ref Range    Extra Tube Hold for add-ons.    Lavender Top   Result Value Ref Range    Extra Tube Hold for add-ons.    SST TOP   Result Value Ref Range    Extra Tube Hold for add-ons.    Lipid Panel   Result Value Ref Range    Cholesterol 197 0 - 199 mg/dL    HDL-Cholesterol 44.0 mg/dL    Cholesterol/HDL Ratio 4.5     LDL Calculated 106 (H) <=99 mg/dL    VLDL 47 (H) 0 - 40 mg/dL    Triglycerides 234 (H) 0 - 149 mg/dL    Non HDL Cholesterol 153 (H) 0 - 149 mg/dL   Hemoglobin A1C   Result Value Ref Range    Hemoglobin A1C 6.4 (H) see below %    Estimated Average Glucose 137 Not Established mg/dL   Iron and TIBC   Result Value Ref Range    Iron 18 (L) 35 - 150 ug/dL    UIBC >450 (H) 110 - 370 ug/dL     TIBC      % Saturation     Ferritin   Result Value Ref Range    Ferritin 14 8 - 150 ng/mL   TSH with reflex to Free T4 if abnormal   Result Value Ref Range    Thyroid Stimulating Hormone 18.01 (H) 0.44 - 3.98 mIU/L   Vitamin B12   Result Value Ref Range    Vitamin B12 298 211 - 911 pg/mL   Folate   Result Value Ref Range    Folate, Serum 8.5 >5.0 ng/mL   Thyroxine, Free   Result Value Ref Range    Thyroxine, Free 0.56 (L) 0.61 - 1.12 ng/dL   Triiodothyronine, Total   Result Value Ref Range    Triiodothyronine 100 60 - 200 ng/dL   POCT glucose   Result Value Ref Range    POCT Glucose 132 (A) 74 - 99 mg/dL   POCT GLUCOSE   Result Value Ref Range    POCT Glucose 132 (H) 74 - 99 mg/dL   ECG 12 lead   Result Value Ref Range    Ventricular Rate 74 BPM    Atrial Rate 74 BPM    IN Interval 182 ms    QRS Duration 86 ms    QT Interval 416 ms    QTC Calculation(Bazett) 461 ms    P Axis 27 degrees    R Axis -11 degrees    T Axis -5 degrees    QRS Count 12 beats    Q Onset 225 ms    P Onset 134 ms    P Offset 195 ms    T Offset 433 ms    QTC Fredericia 446 ms   POCT GLUCOSE   Result Value Ref Range    POCT Glucose 119 (H) 74 - 99 mg/dL   POCT GLUCOSE   Result Value Ref Range    POCT Glucose 149 (H) 74 - 99 mg/dL   SST TOP   Result Value Ref Range    Extra Tube Hold for add-ons.    Magnesium   Result Value Ref Range    Magnesium 1.87 1.60 - 2.40 mg/dL   Basic Metabolic Panel   Result Value Ref Range    Glucose 124 (H) 74 - 99 mg/dL    Sodium 138 136 - 145 mmol/L    Potassium 3.8 3.5 - 5.3 mmol/L    Chloride 105 98 - 107 mmol/L    Bicarbonate 25 21 - 32 mmol/L    Anion Gap 12 10 - 20 mmol/L    Urea Nitrogen 17 6 - 23 mg/dL    Creatinine 1.06 (H) 0.50 - 1.05 mg/dL    eGFR 56 (L) >60 mL/min/1.73m*2    Calcium 9.1 8.6 - 10.3 mg/dL   CBC   Result Value Ref Range    WBC 5.9 4.4 - 11.3 x10*3/uL    nRBC 0.0 0.0 - 0.0 /100 WBCs    RBC 3.73 (L) 4.00 - 5.20 x10*6/uL    Hemoglobin 8.6 (L) 12.0 - 16.0 g/dL    Hematocrit 29.9 (L) 36.0 - 46.0 %     MCV 80 80 - 100 fL    MCH 23.1 (L) 26.0 - 34.0 pg    MCHC 28.8 (L) 32.0 - 36.0 g/dL    RDW 16.4 (H) 11.5 - 14.5 %    Platelets 227 150 - 450 x10*3/uL   Type And Screen   Result Value Ref Range    ABO TYPE O     Rh TYPE POS     ANTIBODY SCREEN NEG    POCT GLUCOSE   Result Value Ref Range    POCT Glucose 136 (H) 74 - 99 mg/dL        Imaging:  MR brain wo IV contrast  Narrative: Interpreted By:  Edson Dela Cruz,   STUDY:  MR BRAIN WO IV CONTRAST;  6/14/2024 6:28 pm      INDICATION:  Signs/Symptoms:L sided upper and lower extremity weakness, L facial  droop, dysarthria.      COMPARISON:  None.      ACCESSION NUMBER(S):  [id number]      ORDERING CLINICIAN:  PEPE ANTHONY      TECHNIQUE:  Axial T2, FLAIR, DWI, gradient echo T2 and sagittal and coronal T1  weighted images of brain were acquired.      FINDINGS:  Subtle focus of diffusion restriction is present in the right  anterior medulla (series 7, image 9) suspicious for a small infarct.  Slight associated T2 hyperintense signal is present, likely  representing mild vasogenic edema.      No additional areas of diffusion restriction are identified  intracranially. No supratentorial infarcts are identified.      There is no evidence of intracranial hemorrhage. No mass effect or  midline shift is present. No pathologic hemosiderin staining is  identified.      No ventricular dilatation is present. Basal cisterns are patent. No  extra-axial fluid collections are identified.      Visualized large arterial flow voids are preserved, including  intracranial carotid and basilar arteries.      Scattered foci of hyperintense FLAIR and T2 signal changes are  present in the periventricular and subcortical white matter of  bilateral cerebral hemispheres, nonspecific findings favored to  represent chronic sequela of microvascular disease.      Small amount of T2 signal is present in the right mastoid air cells.  Paranasal sinuses do not demonstrate any abnormal  signal  abnormalities.      Impression: 1. Small focus of diffusion restriction with associated T2 signal  changes in the right anterior medulla suspicious for an acute to  early subacute infarct. No additional areas of infarction are  identified intracranially.  2. Scattered areas of hyperintense FLAIR and T2 signal present in the  periventricular and subcortical white matter of bilateral cerebral  hemispheres are nonspecific, but favored to represent chronic sequela  of microvascular disease.      MACRO:  None      Signed by: Edson Dela Cruz 6/14/2024 7:41 PM  Dictation workstation:   [id number]  CT brain attack head wo IV contrast  Narrative: Interpreted By:  Rayshawn Blandon,   STUDY:  CT BRAIN ATTACK HEAD WO IV CONTRAST;  6/14/2024 10:49 am      INDICATION:  Signs/Symptoms:Stroke Evaluation.      COMPARISON:  None.      ACCESSION NUMBER(S):  [id number]      ORDERING CLINICIAN:  ARDEN DELACRUZ      TECHNIQUE:  Noncontrast axial CT scan of head was performed. Angled reformats in  brain and bone windows and coronal and sagittal reconstructions were  generated. The images were reviewed in bone, brain, blood and soft  tissue windows.      FINDINGS:  CSF Spaces: The ventricles, sulci and basal cisterns are within  normal limits the patient's age. There is no extraaxial fluid  collection.      Parenchyma: A roughly 2 x 1 x 1 cm mildly hypoattenuating focus in  the left parietal white matter could be due to microangiopathy or an  infarct of uncertain age, but does not correlate with the history of  left-sided weakness.It could be further evaluated with MRI including  diffusion-weighted imaging if clinically indicated. The grey-white  differentiation is intact. There is no midline shift, other mass  effect or intracranial hemorrhage. Moderate bilateral carotid siphon  and minimal distal right vertebral artery calcification is noted.      Calvarium and bone: The calvarium is unremarkable. Edentulous.       Paranasal sinuses and mastoids: The mastoid air cells and tympanic  cavities are well developed and clear bilaterally. The visualized  left maxillary sinus is hypoplastic and opacified with dense contents  and bony wall thickening. This could be more completely evaluated  with nonemergent sinus CT. An opacified left ethmoid air cell is  unlikely to be clinically significant. The frontal sinus is not  developed.      Impression: Left parietal white matter hypoattenuating focus as discussed above.      No evidence of intracranial hemorrhage or displaced skull fracture.      Hypoplastic opacified left maxillary sinus.      MACRO:  Rayshawn Blandon MD discussed the significance and urgency of this  critical finding by telephone with  ARDEN DELACRUZ on 6/14/2024 at  11:20 am.  (**-RCF-**) Findings:  See findings.          Signed by: Rayshawn Blandon 6/14/2024 11:22 AM  Dictation workstation:   [id number]  ECG 12 lead  Sinus rhythm with frequent Premature ventricular complexes  Moderate voltage criteria for LVH, may be normal variant  Cannot rule out Septal infarct , age undetermined  Abnormal ECG  No previous ECGs available  See ED provider note for full interpretation and clinical correlation  CT brain attack angio head and neck W and WO IV contrast  Narrative: Interpreted By:  Frank Raman,   STUDY:  CT BRAIN ATTACK ANGIO HEAD AND NECK W AND WO IV CONTRAST; ;  6/14/2024 10:56 am      INDICATION:  Signs/Symptoms:left side weakness, facial droop.      COMPARISON:  None.      ACCESSION NUMBER(S):  [id number]      ORDERING CLINICIAN:  ARDEN DELACRUZ      TECHNIQUE:  Thin cut axial CT images were generated over the head prior to and  during the arterial passage of a full contrast bolus.  The bolus was  generated with a power injector and followed with immediate saline  flush. These image data were subtracted and then used for 3-D  reconstructions. Maximum intensity projections and shaded surface  displays were  generated in multiple planes. In addition images were  transferred into a 3-D processing program and additional projections  and displays were reviewed by the interpreting physician.      FINDINGS:  The CT angiogram through the upper thorax demonstrates mild  atherosclerotic calcifications along the aortic arch and origins of  the right brachiocephalic artery and left subclavian artery. No  significant stenosis noted along the origins of the right  brachiocephalic artery, left common carotid, or left subclavian  artery. No significant stenosis is noted along the origins of the  vertebral arteries.      The CT images of the neck demonstrates atherosclerotic calcifications  along the origin/proximal internal carotid arteries bilaterally  contributing to mild non hemodynamically significant narrowing  utilizing the more distal cervical segments of the internal carotid  arteries as a point of reference. No significant stenosis is noted  along the cervical segments of the vertebral arteries. There is a  left dominant vertebral artery.      The CT angiogram of the head demonstrates a small caliber distal  right vertebral artery which terminates in the right posterior  inferior cerebellar artery. No significant stenosis is noted along  the distal left dominant vertebral artery or basilar artery.  Atherosclerotic calcifications are noted along the bilateral carotid  siphons contributing to mild non hemodynamically significant  narrowing. There is a hypoplastic A1 segment of the right anterior  cerebral artery with the more distal right anterior cerebral artery  supplied from the left via a patent anterior communicating artery.  Patent posterior communicating arteries are identified bilaterally.  No large vessel branch cutoffs of the anterior cerebral arteries,  middle cerebral arteries, or posterior cerebral arteries are noted.  No intracranial aneurysm is noted.      The source CT angiogram images of the head demonstrate  mild to  moderate brain parenchymal volume loss. There are nonspecific white  matter changes noted within cerebral hemispheres bilaterally which  while nonspecific, given the patient's age, may represent sequelae of  small-vessel ischemic change. There is no midline shift. There is  opacification of an asymmetrically hypoplastic left maxillary sinus  as well as opacification of a few left ethmoid air cells.      The source CT angiogram images of the neck demonstrate multilevel  cervical spondylosis most pronounced at the C5/6 and C6/7 levels.          Impression: The CT angiogram through the upper thorax demonstrates mild  atherosclerotic calcifications along the aortic arch and origins of  the right brachiocephalic artery and left subclavian artery. No  significant stenosis noted along the origins of the right  brachiocephalic artery, left common carotid, or left subclavian  artery. No significant stenosis is noted along the origins of the  vertebral arteries.      The CT images of the neck demonstrates atherosclerotic calcifications  along the origin/proximal internal carotid arteries bilaterally  contributing to mild non hemodynamically significant narrowing  utilizing the more distal cervical segments of the internal carotid  arteries as a point of reference. No significant stenosis is noted  along the cervical segments of the vertebral arteries. There is a  left dominant vertebral artery.      The CT angiogram of the head demonstrates a small caliber distal  right vertebral artery which terminates in the right posterior  inferior cerebellar artery. No significant stenosis is noted along  the distal left dominant vertebral artery or basilar artery.  Atherosclerotic calcifications are noted along the bilateral carotid  siphons contributing to mild non hemodynamically significant  narrowing. There is a hypoplastic A1 segment of the right anterior  cerebral artery with the more distal right anterior cerebral  artery  supplied from the left via a patent anterior communicating artery.  Patent posterior communicating arteries are identified bilaterally.  No large vessel branch cutoffs of the anterior cerebral arteries,  middle cerebral arteries, or posterior cerebral arteries are noted.  No intracranial aneurysm is noted.      The source CT angiogram images of the head demonstrate mild to  moderate brain parenchymal volume loss. There are nonspecific white  matter changes noted within cerebral hemispheres bilaterally which  while nonspecific, given the patient's age, may represent sequelae of  small-vessel ischemic change.      The source CT angiogram images of the neck demonstrate multilevel  cervical spondylosis most pronounced at the C5/6 and C6/7 levels.      MACRO:  None      Signed by: Frank Raman 6/14/2024 11:11 AM  Dictation workstation:   [id number]

## 2024-06-16 VITALS
DIASTOLIC BLOOD PRESSURE: 58 MMHG | HEIGHT: 66 IN | BODY MASS INDEX: 31.18 KG/M2 | RESPIRATION RATE: 16 BRPM | SYSTOLIC BLOOD PRESSURE: 131 MMHG | HEART RATE: 69 BPM | TEMPERATURE: 97.9 F | OXYGEN SATURATION: 97 % | WEIGHT: 194 LBS

## 2024-06-16 LAB
ANION GAP SERPL CALC-SCNC: 12 MMOL/L (ref 10–20)
ATRIAL RATE: 74 BPM
BUN SERPL-MCNC: 17 MG/DL (ref 6–23)
CALCIUM SERPL-MCNC: 9.1 MG/DL (ref 8.6–10.3)
CHLORIDE SERPL-SCNC: 105 MMOL/L (ref 98–107)
CO2 SERPL-SCNC: 25 MMOL/L (ref 21–32)
CREAT SERPL-MCNC: 1.02 MG/DL (ref 0.5–1.05)
EGFRCR SERPLBLD CKD-EPI 2021: 58 ML/MIN/1.73M*2
ERYTHROCYTE [DISTWIDTH] IN BLOOD BY AUTOMATED COUNT: 16.4 % (ref 11.5–14.5)
GLUCOSE BLD MANUAL STRIP-MCNC: 122 MG/DL (ref 74–99)
GLUCOSE BLD MANUAL STRIP-MCNC: 131 MG/DL (ref 74–99)
GLUCOSE BLD MANUAL STRIP-MCNC: 131 MG/DL (ref 74–99)
GLUCOSE BLD MANUAL STRIP-MCNC: 156 MG/DL (ref 74–99)
GLUCOSE SERPL-MCNC: 135 MG/DL (ref 74–99)
HCT VFR BLD AUTO: 30.5 % (ref 36–46)
HGB BLD-MCNC: 8.6 G/DL (ref 12–16)
HOLD SPECIMEN: NORMAL
MAGNESIUM SERPL-MCNC: 1.94 MG/DL (ref 1.6–2.4)
MCH RBC QN AUTO: 22.6 PG (ref 26–34)
MCHC RBC AUTO-ENTMCNC: 28.2 G/DL (ref 32–36)
MCV RBC AUTO: 80 FL (ref 80–100)
NRBC BLD-RTO: 0.3 /100 WBCS (ref 0–0)
P AXIS: 27 DEGREES
P OFFSET: 195 MS
P ONSET: 134 MS
PLATELET # BLD AUTO: 186 X10*3/UL (ref 150–450)
POTASSIUM SERPL-SCNC: 3.7 MMOL/L (ref 3.5–5.3)
PR INTERVAL: 182 MS
Q ONSET: 225 MS
QRS COUNT: 12 BEATS
QRS DURATION: 86 MS
QT INTERVAL: 416 MS
QTC CALCULATION(BAZETT): 461 MS
QTC FREDERICIA: 446 MS
R AXIS: -11 DEGREES
RBC # BLD AUTO: 3.81 X10*6/UL (ref 4–5.2)
SODIUM SERPL-SCNC: 138 MMOL/L (ref 136–145)
T AXIS: -5 DEGREES
T OFFSET: 433 MS
VENTRICULAR RATE: 74 BPM
WBC # BLD AUTO: 5.9 X10*3/UL (ref 4.4–11.3)

## 2024-06-16 PROCEDURE — 2500000001 HC RX 250 WO HCPCS SELF ADMINISTERED DRUGS (ALT 637 FOR MEDICARE OP): Performed by: INTERNAL MEDICINE

## 2024-06-16 PROCEDURE — 82947 ASSAY GLUCOSE BLOOD QUANT: CPT | Mod: 91

## 2024-06-16 PROCEDURE — 2500000002 HC RX 250 W HCPCS SELF ADMINISTERED DRUGS (ALT 637 FOR MEDICARE OP, ALT 636 FOR OP/ED): Mod: MUE | Performed by: INTERNAL MEDICINE

## 2024-06-16 PROCEDURE — 97110 THERAPEUTIC EXERCISES: CPT | Mod: GP,CQ

## 2024-06-16 PROCEDURE — 99233 SBSQ HOSP IP/OBS HIGH 50: CPT | Performed by: PSYCHIATRY & NEUROLOGY

## 2024-06-16 PROCEDURE — 97530 THERAPEUTIC ACTIVITIES: CPT | Mod: GP,CQ

## 2024-06-16 PROCEDURE — 2500000004 HC RX 250 GENERAL PHARMACY W/ HCPCS (ALT 636 FOR OP/ED): Performed by: INTERNAL MEDICINE

## 2024-06-16 PROCEDURE — 99232 SBSQ HOSP IP/OBS MODERATE 35: CPT | Performed by: INTERNAL MEDICINE

## 2024-06-16 PROCEDURE — 83735 ASSAY OF MAGNESIUM: CPT | Performed by: INTERNAL MEDICINE

## 2024-06-16 PROCEDURE — 85027 COMPLETE CBC AUTOMATED: CPT | Performed by: INTERNAL MEDICINE

## 2024-06-16 PROCEDURE — 1200000002 HC GENERAL ROOM WITH TELEMETRY DAILY

## 2024-06-16 PROCEDURE — 36415 COLL VENOUS BLD VENIPUNCTURE: CPT | Performed by: INTERNAL MEDICINE

## 2024-06-16 PROCEDURE — 80048 BASIC METABOLIC PNL TOTAL CA: CPT | Performed by: INTERNAL MEDICINE

## 2024-06-16 RX ORDER — FERROUS SULFATE 325(65) MG
1 TABLET ORAL EVERY OTHER DAY
Status: DISCONTINUED | OUTPATIENT
Start: 2024-06-16 | End: 2024-06-17 | Stop reason: HOSPADM

## 2024-06-16 RX ORDER — METOPROLOL SUCCINATE 50 MG/1
50 TABLET, EXTENDED RELEASE ORAL DAILY
Status: DISCONTINUED | OUTPATIENT
Start: 2024-06-16 | End: 2024-06-17 | Stop reason: HOSPADM

## 2024-06-16 ASSESSMENT — COGNITIVE AND FUNCTIONAL STATUS - GENERAL
HELP NEEDED FOR BATHING: A LOT
MOVING TO AND FROM BED TO CHAIR: TOTAL
WALKING IN HOSPITAL ROOM: TOTAL
EATING MEALS: A LITTLE
TURNING FROM BACK TO SIDE WHILE IN FLAT BAD: A LOT
PERSONAL GROOMING: A LOT
TOILETING: TOTAL
MOBILITY SCORE: 9
DRESSING REGULAR LOWER BODY CLOTHING: TOTAL
MOVING FROM LYING ON BACK TO SITTING ON SIDE OF FLAT BED WITH BEDRAILS: A LITTLE
WALKING IN HOSPITAL ROOM: TOTAL
DRESSING REGULAR UPPER BODY CLOTHING: A LOT
CLIMB 3 TO 5 STEPS WITH RAILING: TOTAL
MOVING TO AND FROM BED TO CHAIR: A LOT
DAILY ACTIVITIY SCORE: 11
STANDING UP FROM CHAIR USING ARMS: TOTAL
STANDING UP FROM CHAIR USING ARMS: TOTAL
CLIMB 3 TO 5 STEPS WITH RAILING: TOTAL
MOBILITY SCORE: 11
TURNING FROM BACK TO SIDE WHILE IN FLAT BAD: A LOT

## 2024-06-16 ASSESSMENT — PAIN - FUNCTIONAL ASSESSMENT: PAIN_FUNCTIONAL_ASSESSMENT: 0-10

## 2024-06-16 ASSESSMENT — PAIN SCALES - GENERAL
PAINLEVEL_OUTOF10: 0 - NO PAIN
PAINLEVEL_OUTOF10: 0 - NO PAIN

## 2024-06-16 NOTE — PROGRESS NOTES
ASSESSMENT & PLAN:     Acute R medullary ischemic stroke   -p/w L sided UE and LE weakness, numbness, L facial droop, slurred speech  -was out of window for thrombolytic therapy  -CT head noncon showed possible L parietal hypo-attenuating focus, although does not correlate with her presenting symptoms  -MRI brain done, showed acute infarct in R anterior medulla  -CTA H+N neg for LVO  -EKG did not show Afib, no Afib on tele  -A1C, lipid panel reviewed  Plan:  -Neuro consult. ASA 81, high intensity statin. Will avoid DAPT due to NIHSS >5 and iron deficiency anemia. Check TTE, still pending. Tele monitoring.   -dispo is acute rehab when ready, after TTE     Iron deficiency anemia - Hgb 9.2 on admission, previous baseline in 2023 was normal Hgb. No overt bleeding. Reportedly hemooccult positive in ED. Iron studies show deficiency. Hgb 8.6 today. Would not scope here inpatient anyway given acute ischemic stroke. Will need outpt fu with GI. Got IV iron here, started on PO supplement now.   Hypothyroidism - TSH 18.01, FT4 0.56, total T3 wnl. Started on levothyroxine 50mcg here. Endocrinology was consulted. Will need rpt TFTs as outpt in 4-6 weeks.   HTN - can resume home metoprolol now  HLD - lipid panel reviewed, high intensity statin  DM2 - A1C 6.4%. Accuchecks, ssi for now. Hold home metformin.   CKD3 - Scr at baseline, monitor  MDD - home paxil  Anxiety - home Xanax PRN  Glaucoma - home eye gtt  Vte ppx sqh  Full code, discussed with pt  Dispo acute rehab when ready    Emory Peres MD    SUBJECTIVE     NAEON. No changes from yesterday. Weakness somewhat improved she feels.     OBJECTIVE:       Last Recorded Vitals:  Vitals:    06/15/24 1900 06/15/24 2353 06/16/24 0421 06/16/24 0812   BP: 126/67 115/58 128/62 138/65   Pulse: 82 71 81 90   Resp: 18 18 18 18   Temp: 36.7 °C (98.1 °F) 36.1 °C (97 °F) 36.2 °C (97.2 °F) 36.4 °C (97.5 °F)   TempSrc:    Temporal   SpO2: 95% 95% 94% 96%   Weight:       Height:           Last  I/O:  I/O last 3 completed shifts:  In: 460 (5.2 mL/kg) [P.O.:360; IV Piggyback:100]  Out: 700 (8 mL/kg) [Urine:700 (0.2 mL/kg/hr)]  Weight: 88 kg     Physical Exam:  GEN: appears stated age, NAD  CV: RRR, no m/r/g, no LE edema  LUNGS: CTAB, no w/r/c  ABD: soft, NT, obese  SKIN: no rashes  MSK; no gross deformities, normal joints  NEURO: A+Ox3, L facial droop, LUE and LLE 3/5 strength, sensation to light touch dull throughout shawn and lle  PSYCH: appropriate mood, affect    Inpatient Medications:  aspirin, 81 mg, oral, Daily  atorvastatin, 40 mg, oral, Nightly  ferrous sulfate (325 mg ferrous sulfate), 1 tablet, oral, Every other day  heparin (porcine), 5,000 Units, subcutaneous, q8h  insulin lispro, 0-10 Units, subcutaneous, TID  latanoprost, 1 drop, Both Eyes, Nightly  levothyroxine, 50 mcg, oral, Daily  PARoxetine, 40 mg, oral, Daily  perflutren lipid microspheres, 0.5-10 mL of dilution, intravenous, Once in imaging  perflutren protein A microsphere, 0.5 mL, intravenous, Once in imaging  sulfur hexafluoride microsphr, 2 mL, intravenous, Once in imaging        PRN Medications  PRN medications: acetaminophen, ALPRAZolam, dextrose, dextrose, glucagon, glucagon, hydrALAZINE **FOLLOWED BY** hydrALAZINE, ondansetron, oxygen    Continuous Medications:         LABS AND IMAGING:     Labs:  Results for orders placed or performed during the hospital encounter of 06/14/24 (from the past 24 hour(s))   POCT GLUCOSE   Result Value Ref Range    POCT Glucose 147 (H) 74 - 99 mg/dL   POCT GLUCOSE   Result Value Ref Range    POCT Glucose 119 (H) 74 - 99 mg/dL   POCT GLUCOSE   Result Value Ref Range    POCT Glucose 164 (H) 74 - 99 mg/dL   POCT GLUCOSE   Result Value Ref Range    POCT Glucose 131 (H) 74 - 99 mg/dL   POCT GLUCOSE   Result Value Ref Range    POCT Glucose 122 (H) 74 - 99 mg/dL   Magnesium   Result Value Ref Range    Magnesium 1.94 1.60 - 2.40 mg/dL   Basic Metabolic Panel   Result Value Ref Range    Glucose 135 (H) 74 -  99 mg/dL    Sodium 138 136 - 145 mmol/L    Potassium 3.7 3.5 - 5.3 mmol/L    Chloride 105 98 - 107 mmol/L    Bicarbonate 25 21 - 32 mmol/L    Anion Gap 12 10 - 20 mmol/L    Urea Nitrogen 17 6 - 23 mg/dL    Creatinine 1.02 0.50 - 1.05 mg/dL    eGFR 58 (L) >60 mL/min/1.73m*2    Calcium 9.1 8.6 - 10.3 mg/dL   CBC   Result Value Ref Range    WBC 5.9 4.4 - 11.3 x10*3/uL    nRBC 0.3 (H) 0.0 - 0.0 /100 WBCs    RBC 3.81 (L) 4.00 - 5.20 x10*6/uL    Hemoglobin 8.6 (L) 12.0 - 16.0 g/dL    Hematocrit 30.5 (L) 36.0 - 46.0 %    MCV 80 80 - 100 fL    MCH 22.6 (L) 26.0 - 34.0 pg    MCHC 28.2 (L) 32.0 - 36.0 g/dL    RDW 16.4 (H) 11.5 - 14.5 %    Platelets 186 150 - 450 x10*3/uL   SST TOP   Result Value Ref Range    Extra Tube Hold for add-ons.         Imaging:  MR brain wo IV contrast  Narrative: Interpreted By:  Edson Dela Cruz,   STUDY:  MR BRAIN WO IV CONTRAST;  6/14/2024 6:28 pm      INDICATION:  Signs/Symptoms:L sided upper and lower extremity weakness, L facial  droop, dysarthria.      COMPARISON:  None.      ACCESSION NUMBER(S):  [id number]      ORDERING CLINICIAN:  PEPE ANTHONY      TECHNIQUE:  Axial T2, FLAIR, DWI, gradient echo T2 and sagittal and coronal T1  weighted images of brain were acquired.      FINDINGS:  Subtle focus of diffusion restriction is present in the right  anterior medulla (series 7, image 9) suspicious for a small infarct.  Slight associated T2 hyperintense signal is present, likely  representing mild vasogenic edema.      No additional areas of diffusion restriction are identified  intracranially. No supratentorial infarcts are identified.      There is no evidence of intracranial hemorrhage. No mass effect or  midline shift is present. No pathologic hemosiderin staining is  identified.      No ventricular dilatation is present. Basal cisterns are patent. No  extra-axial fluid collections are identified.      Visualized large arterial flow voids are preserved, including  intracranial carotid  and basilar arteries.      Scattered foci of hyperintense FLAIR and T2 signal changes are  present in the periventricular and subcortical white matter of  bilateral cerebral hemispheres, nonspecific findings favored to  represent chronic sequela of microvascular disease.      Small amount of T2 signal is present in the right mastoid air cells.  Paranasal sinuses do not demonstrate any abnormal signal  abnormalities.      Impression: 1. Small focus of diffusion restriction with associated T2 signal  changes in the right anterior medulla suspicious for an acute to  early subacute infarct. No additional areas of infarction are  identified intracranially.  2. Scattered areas of hyperintense FLAIR and T2 signal present in the  periventricular and subcortical white matter of bilateral cerebral  hemispheres are nonspecific, but favored to represent chronic sequela  of microvascular disease.      MACRO:  None      Signed by: Edson Dela Cruz 6/14/2024 7:41 PM  Dictation workstation:   [id number]  CT brain attack head wo IV contrast  Narrative: Interpreted By:  Rayshawn Blandon,   STUDY:  CT BRAIN ATTACK HEAD WO IV CONTRAST;  6/14/2024 10:49 am      INDICATION:  Signs/Symptoms:Stroke Evaluation.      COMPARISON:  None.      ACCESSION NUMBER(S):  [id number]      ORDERING CLINICIAN:  ARDEN DELACRUZ      TECHNIQUE:  Noncontrast axial CT scan of head was performed. Angled reformats in  brain and bone windows and coronal and sagittal reconstructions were  generated. The images were reviewed in bone, brain, blood and soft  tissue windows.      FINDINGS:  CSF Spaces: The ventricles, sulci and basal cisterns are within  normal limits the patient's age. There is no extraaxial fluid  collection.      Parenchyma: A roughly 2 x 1 x 1 cm mildly hypoattenuating focus in  the left parietal white matter could be due to microangiopathy or an  infarct of uncertain age, but does not correlate with the history of  left-sided  weakness.It could be further evaluated with MRI including  diffusion-weighted imaging if clinically indicated. The grey-white  differentiation is intact. There is no midline shift, other mass  effect or intracranial hemorrhage. Moderate bilateral carotid siphon  and minimal distal right vertebral artery calcification is noted.      Calvarium and bone: The calvarium is unremarkable. Edentulous.      Paranasal sinuses and mastoids: The mastoid air cells and tympanic  cavities are well developed and clear bilaterally. The visualized  left maxillary sinus is hypoplastic and opacified with dense contents  and bony wall thickening. This could be more completely evaluated  with nonemergent sinus CT. An opacified left ethmoid air cell is  unlikely to be clinically significant. The frontal sinus is not  developed.      Impression: Left parietal white matter hypoattenuating focus as discussed above.      No evidence of intracranial hemorrhage or displaced skull fracture.      Hypoplastic opacified left maxillary sinus.      MACRO:  Rayshawn Blandon MD discussed the significance and urgency of this  critical finding by telephone with  ARDEN DELACRUZ on 6/14/2024 at  11:20 am.  (**-RCF-**) Findings:  See findings.          Signed by: Rayshawn Blandon 6/14/2024 11:22 AM  Dictation workstation:   [id number]  ECG 12 lead  Sinus rhythm with frequent Premature ventricular complexes  Moderate voltage criteria for LVH, may be normal variant  Cannot rule out Septal infarct , age undetermined  Abnormal ECG  No previous ECGs available  See ED provider note for full interpretation and clinical correlation  CT brain attack angio head and neck W and WO IV contrast  Narrative: Interpreted By:  Frank Raman,   STUDY:  CT BRAIN ATTACK ANGIO HEAD AND NECK W AND WO IV CONTRAST; ;  6/14/2024 10:56 am      INDICATION:  Signs/Symptoms:left side weakness, facial droop.      COMPARISON:  None.      ACCESSION NUMBER(S):  [id number]       ORDERING CLINICIAN:  ARDEN DELACRUZ      TECHNIQUE:  Thin cut axial CT images were generated over the head prior to and  during the arterial passage of a full contrast bolus.  The bolus was  generated with a power injector and followed with immediate saline  flush. These image data were subtracted and then used for 3-D  reconstructions. Maximum intensity projections and shaded surface  displays were generated in multiple planes. In addition images were  transferred into a 3-D processing program and additional projections  and displays were reviewed by the interpreting physician.      FINDINGS:  The CT angiogram through the upper thorax demonstrates mild  atherosclerotic calcifications along the aortic arch and origins of  the right brachiocephalic artery and left subclavian artery. No  significant stenosis noted along the origins of the right  brachiocephalic artery, left common carotid, or left subclavian  artery. No significant stenosis is noted along the origins of the  vertebral arteries.      The CT images of the neck demonstrates atherosclerotic calcifications  along the origin/proximal internal carotid arteries bilaterally  contributing to mild non hemodynamically significant narrowing  utilizing the more distal cervical segments of the internal carotid  arteries as a point of reference. No significant stenosis is noted  along the cervical segments of the vertebral arteries. There is a  left dominant vertebral artery.      The CT angiogram of the head demonstrates a small caliber distal  right vertebral artery which terminates in the right posterior  inferior cerebellar artery. No significant stenosis is noted along  the distal left dominant vertebral artery or basilar artery.  Atherosclerotic calcifications are noted along the bilateral carotid  siphons contributing to mild non hemodynamically significant  narrowing. There is a hypoplastic A1 segment of the right anterior  cerebral artery with the more  distal right anterior cerebral artery  supplied from the left via a patent anterior communicating artery.  Patent posterior communicating arteries are identified bilaterally.  No large vessel branch cutoffs of the anterior cerebral arteries,  middle cerebral arteries, or posterior cerebral arteries are noted.  No intracranial aneurysm is noted.      The source CT angiogram images of the head demonstrate mild to  moderate brain parenchymal volume loss. There are nonspecific white  matter changes noted within cerebral hemispheres bilaterally which  while nonspecific, given the patient's age, may represent sequelae of  small-vessel ischemic change. There is no midline shift. There is  opacification of an asymmetrically hypoplastic left maxillary sinus  as well as opacification of a few left ethmoid air cells.      The source CT angiogram images of the neck demonstrate multilevel  cervical spondylosis most pronounced at the C5/6 and C6/7 levels.          Impression: The CT angiogram through the upper thorax demonstrates mild  atherosclerotic calcifications along the aortic arch and origins of  the right brachiocephalic artery and left subclavian artery. No  significant stenosis noted along the origins of the right  brachiocephalic artery, left common carotid, or left subclavian  artery. No significant stenosis is noted along the origins of the  vertebral arteries.      The CT images of the neck demonstrates atherosclerotic calcifications  along the origin/proximal internal carotid arteries bilaterally  contributing to mild non hemodynamically significant narrowing  utilizing the more distal cervical segments of the internal carotid  arteries as a point of reference. No significant stenosis is noted  along the cervical segments of the vertebral arteries. There is a  left dominant vertebral artery.      The CT angiogram of the head demonstrates a small caliber distal  right vertebral artery which terminates in the right  posterior  inferior cerebellar artery. No significant stenosis is noted along  the distal left dominant vertebral artery or basilar artery.  Atherosclerotic calcifications are noted along the bilateral carotid  siphons contributing to mild non hemodynamically significant  narrowing. There is a hypoplastic A1 segment of the right anterior  cerebral artery with the more distal right anterior cerebral artery  supplied from the left via a patent anterior communicating artery.  Patent posterior communicating arteries are identified bilaterally.  No large vessel branch cutoffs of the anterior cerebral arteries,  middle cerebral arteries, or posterior cerebral arteries are noted.  No intracranial aneurysm is noted.      The source CT angiogram images of the head demonstrate mild to  moderate brain parenchymal volume loss. There are nonspecific white  matter changes noted within cerebral hemispheres bilaterally which  while nonspecific, given the patient's age, may represent sequelae of  small-vessel ischemic change.      The source CT angiogram images of the neck demonstrate multilevel  cervical spondylosis most pronounced at the C5/6 and C6/7 levels.      MACRO:  None      Signed by: Frank Raman 6/14/2024 11:11 AM  Dictation workstation:   [id number]

## 2024-06-16 NOTE — PROGRESS NOTES
"Niurka Parada is a 73 y.o. female on day 1 of admission presenting with Symptoms of cerebrovascular accident (CVA).    Subjective   Patient feeling better left-sided weakness is improving no headache nausea vomiting or any new symptoms.  Patient is tolerating the therapy well and is getting iron infusions for anemia    No seizure or seizure-like activity.       Last Recorded Vitals  Blood pressure 135/79, pulse 79, temperature 35.4 °C (95.7 °F), resp. rate 16, height 1.676 m (5' 6\"), weight 88 kg (194 lb 0.1 oz), SpO2 94%.    Physical Exam/Neurological Exam  Expand All Collapse All    History Of Present Illness  Niurka Parada is a 73 y.o. female presenting with left facial droop left-sided upper and lower extremity weakness.  According the patient the symptoms started several hours ago and she woke up in the middle of the night and not feeling well and then she went back to bed and early morning yesterday she noticed that she had some facial weakness and left upper and lower extremities.  The time she called her daughter who is a nurse and came straight to the emergency room.  She was out for the window for TNK.  She had a CAT scan which was unremarkable and showed some area of infarction which were not clinically correlated.  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        Past Medical History  Medical History        Past Medical History:   Diagnosis Date    Anxiety      Depression      Diabetes mellitus (Multi)      Hypertension           Surgical " History  Surgical History         Past Surgical History:   Procedure Laterality Date    CHOLECYSTECTOMY        TOTAL KNEE ARTHROPLASTY Right      TUBAL LIGATION             Social History  Social History           Tobacco Use    Smoking status: Never    Smokeless tobacco: Never   Vaping Use    Vaping status: Never Used   Substance Use Topics    Alcohol use: Not Currently    Drug use: Never      Allergies  Morphine  Home Medications  Prescriptions Prior to Admission           Medications Prior to Admission   Medication Sig Dispense Refill Last Dose    ALPRAZolam (Xanax) 0.5 mg tablet Take 1 tablet (0.5 mg) by mouth 3 times a day.     6/14/2024    latanoprost (Xalatan) 0.005 % ophthalmic solution Administer 1 drop into both eyes once daily at bedtime.     6/13/2024    meloxicam (Mobic) 15 mg tablet Take 1 tablet (15 mg) by mouth once daily as needed.     Past Week    metFORMIN (Glucophage) 1,000 mg tablet Take 1 tablet (1,000 mg) by mouth once daily with breakfast.     6/13/2024    metoprolol succinate XL (Toprol-XL) 50 mg 24 hr tablet Take 1 tablet (50 mg) by mouth once daily.     6/14/2024    naproxen (Naprosyn) 250 mg tablet Take 2 tablets (500 mg) by mouth every 6 hours if needed for mild pain (1 - 3).     6/13/2024    PARoxetine (Paxil) 40 mg tablet Take 1 tablet (40 mg) by mouth once daily.     6/13/2024    pseudoephedrine (Sudafed) 30 mg tablet Take 1 tablet (30 mg) by mouth 2 times a day as needed for congestion.     6/13/2024    ergocalciferol (Vitamin D-2) 1.25 MG (60201 UT) capsule Take 1 capsule (1,250 mcg) by mouth 1 (one) time per week. On Sunday 6/9/2024            Review of Systems   Neurological:  Positive for facial asymmetry, weakness and light-headedness.         Physical Exam  Shows mildly obese.  HEENT respiratory to be equal and react light ear nose and throat was unremarkable and left-sided facial droop     Neck was supple without any carotid bruits and without any lymphadenopathy      Cardiovascular examination normal S1-S2 with clear breath sounds bilaterally abdomen soft distended difficult to palpate with organomegaly     Extremities I did not reveal edema or any rash.  Neurological Exam  Patient was alert awake very pleasant and cooperative she was at her place person and time.  Speech was slightly dysarthric but she was able to follow simple commands.  Memory was intact.  Attention span judgment concentration and focus was appropriate     No hallucinations or delusions.     Cranial revealed Cellosene with no positive the left side.  Extraocular motor intact had slight facial asymmetry and is from the palpebral fissure.  Tongue was in the midline and she was able to elevate the palate and shoulder without difficulties sensory exam was intact to light touch and pinprick and her hearing was normal     Motor exam revealed left hemiparesis with a power of 1-2 or 5 in the upper extremity and 2-3 or 5 in the left lower extremity     Reflexes were asymmetric slightly increased reflexes on the left compared to the right with plantar extensor response bilaterally     Coordination Station and gait were deferred and she was wheelchair-bound     Sensory exam was intact to light touch and pinprick.        Relevant Results  Recent Results (from the past 24 hour(s))   POCT GLUCOSE    Collection Time: 06/15/24  4:31 PM   Result Value Ref Range    POCT Glucose 119 (H) 74 - 99 mg/dL   POCT GLUCOSE    Collection Time: 06/15/24  7:28 PM   Result Value Ref Range    POCT Glucose 164 (H) 74 - 99 mg/dL   POCT GLUCOSE    Collection Time: 06/16/24  5:56 AM   Result Value Ref Range    POCT Glucose 131 (H) 74 - 99 mg/dL   POCT GLUCOSE    Collection Time: 06/16/24  6:17 AM   Result Value Ref Range    POCT Glucose 122 (H) 74 - 99 mg/dL   Magnesium    Collection Time: 06/16/24  6:25 AM   Result Value Ref Range    Magnesium 1.94 1.60 - 2.40 mg/dL   Basic Metabolic Panel    Collection Time: 06/16/24  6:25 AM   Result Value  Ref Range    Glucose 135 (H) 74 - 99 mg/dL    Sodium 138 136 - 145 mmol/L    Potassium 3.7 3.5 - 5.3 mmol/L    Chloride 105 98 - 107 mmol/L    Bicarbonate 25 21 - 32 mmol/L    Anion Gap 12 10 - 20 mmol/L    Urea Nitrogen 17 6 - 23 mg/dL    Creatinine 1.02 0.50 - 1.05 mg/dL    eGFR 58 (L) >60 mL/min/1.73m*2    Calcium 9.1 8.6 - 10.3 mg/dL   CBC    Collection Time: 06/16/24  6:25 AM   Result Value Ref Range    WBC 5.9 4.4 - 11.3 x10*3/uL    nRBC 0.3 (H) 0.0 - 0.0 /100 WBCs    RBC 3.81 (L) 4.00 - 5.20 x10*6/uL    Hemoglobin 8.6 (L) 12.0 - 16.0 g/dL    Hematocrit 30.5 (L) 36.0 - 46.0 %    MCV 80 80 - 100 fL    MCH 22.6 (L) 26.0 - 34.0 pg    MCHC 28.2 (L) 32.0 - 36.0 g/dL    RDW 16.4 (H) 11.5 - 14.5 %    Platelets 186 150 - 450 x10*3/uL   SST TOP    Collection Time: 06/16/24  9:01 AM   Result Value Ref Range    Extra Tube Hold for add-ons.    POCT GLUCOSE    Collection Time: 06/16/24 11:16 AM   Result Value Ref Range    POCT Glucose 156 (H) 74 - 99 mg/dL      NIH Stroke Scale  1A. Level of Consciousness: Alert, Keenly Responsive  1B. Ask Month and Age: Both Questions Right  1C. Blink Eyes & Squeeze Hands: Performs Both Tasks  2. Best Gaze: Normal  3. Visual: No Visual Loss  4. Facial Palsy: Minor Paralysis  5A. Motor - Left Arm: Some Effort Against Gravity  5B. Motor - Right Arm: No Drift  6A. Motor - Left Leg: Some Effort Against Gravity  6B. Motor - Right Leg: No Drift  7. Limb Ataxia: Absent  8. Sensory Loss: Mild-to-Moderate Sensory Loss  9. Best Language: Mild-to-Moderate Aphasia  10. Dysarthria: Mild-to-Moderate Dysarthria  11. Extinction and Inattention: No Abnormality  NIH Stroke Scale: 8           [place] Coma Scale  Best Eye Response: Spontaneous  Best Verbal Response: Oriented  Best Motor Response: Follows commands  [place] Coma Scale Score: 15                MR brain wo IV contrast    Result Date: 6/14/2024  Interpreted By:  Edson Dela Cruz, STUDY: MR BRAIN WO IV CONTRAST;  6/14/2024 6:28 pm    INDICATION: Signs/Symptoms:L sided upper and lower extremity weakness, L facial droop, dysarthria.   COMPARISON: None.   ACCESSION NUMBER(S): [id number]   ORDERING CLINICIAN: PEPE ANTHONY   TECHNIQUE: Axial T2, FLAIR, DWI, gradient echo T2 and sagittal and coronal T1 weighted images of brain were acquired.   FINDINGS: Subtle focus of diffusion restriction is present in the right anterior medulla (series 7, image 9) suspicious for a small infarct. Slight associated T2 hyperintense signal is present, likely representing mild vasogenic edema.   No additional areas of diffusion restriction are identified intracranially. No supratentorial infarcts are identified.   There is no evidence of intracranial hemorrhage. No mass effect or midline shift is present. No pathologic hemosiderin staining is identified.   No ventricular dilatation is present. Basal cisterns are patent. No extra-axial fluid collections are identified.   Visualized large arterial flow voids are preserved, including intracranial carotid and basilar arteries.   Scattered foci of hyperintense FLAIR and T2 signal changes are present in the periventricular and subcortical white matter of bilateral cerebral hemispheres, nonspecific findings favored to represent chronic sequela of microvascular disease.   Small amount of T2 signal is present in the right mastoid air cells. Paranasal sinuses do not demonstrate any abnormal signal abnormalities.       1. Small focus of diffusion restriction with associated T2 signal changes in the right anterior medulla suspicious for an acute to early subacute infarct. No additional areas of infarction are identified intracranially. 2. Scattered areas of hyperintense FLAIR and T2 signal present in the periventricular and subcortical white matter of bilateral cerebral hemispheres are nonspecific, but favored to represent chronic sequela of microvascular disease.   MACRO: None   Signed by: Edson Dela Cruz 6/14/2024 7:41 PM  Dictation workstation:   [id number]    CT brain attack head wo IV contrast    Result Date: 6/14/2024  Interpreted By:  Rayshawn Blandon, STUDY: CT BRAIN ATTACK HEAD WO IV CONTRAST;  6/14/2024 10:49 am   INDICATION: Signs/Symptoms:Stroke Evaluation.   COMPARISON: None.   ACCESSION NUMBER(S): [id number]   ORDERING CLINICIAN: ARDEN DELACRUZ   TECHNIQUE: Noncontrast axial CT scan of head was performed. Angled reformats in brain and bone windows and coronal and sagittal reconstructions were generated. The images were reviewed in bone, brain, blood and soft tissue windows.   FINDINGS: CSF Spaces: The ventricles, sulci and basal cisterns are within normal limits the patient's age. There is no extraaxial fluid collection.   Parenchyma: A roughly 2 x 1 x 1 cm mildly hypoattenuating focus in the left parietal white matter could be due to microangiopathy or an infarct of uncertain age, but does not correlate with the history of left-sided weakness.It could be further evaluated with MRI including diffusion-weighted imaging if clinically indicated. The grey-white differentiation is intact. There is no midline shift, other mass effect or intracranial hemorrhage. Moderate bilateral carotid siphon and minimal distal right vertebral artery calcification is noted.   Calvarium and bone: The calvarium is unremarkable. Edentulous.   Paranasal sinuses and mastoids: The mastoid air cells and tympanic cavities are well developed and clear bilaterally. The visualized left maxillary sinus is hypoplastic and opacified with dense contents and bony wall thickening. This could be more completely evaluated with nonemergent sinus CT. An opacified left ethmoid air cell is unlikely to be clinically significant. The frontal sinus is not developed.       Left parietal white matter hypoattenuating focus as discussed above.   No evidence of intracranial hemorrhage or displaced skull fracture.   Hypoplastic opacified left maxillary sinus.   MACRO:  Rayshawn Blandon MD discussed the significance and urgency of this critical finding by telephone with  ARDEN DELACRUZ on 6/14/2024 at 11:20 am.  (**-RCF-**) Findings:  See findings.     Signed by: Rayshawn Blandon 6/14/2024 11:22 AM Dictation workstation:   [id number]    ECG 12 lead    Result Date: 6/14/2024  Sinus rhythm with frequent Premature ventricular complexes Moderate voltage criteria for LVH, may be normal variant Cannot rule out Septal infarct , age undetermined Abnormal ECG No previous ECGs available See ED provider note for full interpretation and clinical correlation    CT brain attack angio head and neck W and WO IV contrast    Result Date: 6/14/2024  Interpreted By:  Frank Raman, STUDY: CT BRAIN ATTACK ANGIO HEAD AND NECK W AND WO IV CONTRAST; ; 6/14/2024 10:56 am   INDICATION: Signs/Symptoms:left side weakness, facial droop.   COMPARISON: None.   ACCESSION NUMBER(S): [id number]   ORDERING CLINICIAN: ARDEN DELACRUZ   TECHNIQUE: Thin cut axial CT images were generated over the head prior to and during the arterial passage of a full contrast bolus.  The bolus was generated with a power injector and followed with immediate saline flush. These image data were subtracted and then used for 3-D reconstructions. Maximum intensity projections and shaded surface displays were generated in multiple planes. In addition images were transferred into a 3-D processing program and additional projections and displays were reviewed by the interpreting physician.   FINDINGS: The CT angiogram through the upper thorax demonstrates mild atherosclerotic calcifications along the aortic arch and origins of the right brachiocephalic artery and left subclavian artery. No significant stenosis noted along the origins of the right brachiocephalic artery, left common carotid, or left subclavian artery. No significant stenosis is noted along the origins of the vertebral arteries.   The CT images of the neck demonstrates  atherosclerotic calcifications along the origin/proximal internal carotid arteries bilaterally contributing to mild non hemodynamically significant narrowing utilizing the more distal cervical segments of the internal carotid arteries as a point of reference. No significant stenosis is noted along the cervical segments of the vertebral arteries. There is a left dominant vertebral artery.   The CT angiogram of the head demonstrates a small caliber distal right vertebral artery which terminates in the right posterior inferior cerebellar artery. No significant stenosis is noted along the distal left dominant vertebral artery or basilar artery. Atherosclerotic calcifications are noted along the bilateral carotid siphons contributing to mild non hemodynamically significant narrowing. There is a hypoplastic A1 segment of the right anterior cerebral artery with the more distal right anterior cerebral artery supplied from the left via a patent anterior communicating artery. Patent posterior communicating arteries are identified bilaterally. No large vessel branch cutoffs of the anterior cerebral arteries, middle cerebral arteries, or posterior cerebral arteries are noted. No intracranial aneurysm is noted.   The source CT angiogram images of the head demonstrate mild to moderate brain parenchymal volume loss. There are nonspecific white matter changes noted within cerebral hemispheres bilaterally which while nonspecific, given the patient's age, may represent sequelae of small-vessel ischemic change. There is no midline shift. There is opacification of an asymmetrically hypoplastic left maxillary sinus as well as opacification of a few left ethmoid air cells.   The source CT angiogram images of the neck demonstrate multilevel cervical spondylosis most pronounced at the C5/6 and C6/7 levels.         The CT angiogram through the upper thorax demonstrates mild atherosclerotic calcifications along the aortic arch and origins of  the right brachiocephalic artery and left subclavian artery. No significant stenosis noted along the origins of the right brachiocephalic artery, left common carotid, or left subclavian artery. No significant stenosis is noted along the origins of the vertebral arteries.   The CT images of the neck demonstrates atherosclerotic calcifications along the origin/proximal internal carotid arteries bilaterally contributing to mild non hemodynamically significant narrowing utilizing the more distal cervical segments of the internal carotid arteries as a point of reference. No significant stenosis is noted along the cervical segments of the vertebral arteries. There is a left dominant vertebral artery.   The CT angiogram of the head demonstrates a small caliber distal right vertebral artery which terminates in the right posterior inferior cerebellar artery. No significant stenosis is noted along the distal left dominant vertebral artery or basilar artery. Atherosclerotic calcifications are noted along the bilateral carotid siphons contributing to mild non hemodynamically significant narrowing. There is a hypoplastic A1 segment of the right anterior cerebral artery with the more distal right anterior cerebral artery supplied from the left via a patent anterior communicating artery. Patent posterior communicating arteries are identified bilaterally. No large vessel branch cutoffs of the anterior cerebral arteries, middle cerebral arteries, or posterior cerebral arteries are noted. No intracranial aneurysm is noted.   The source CT angiogram images of the head demonstrate mild to moderate brain parenchymal volume loss. There are nonspecific white matter changes noted within cerebral hemispheres bilaterally which while nonspecific, given the patient's age, may represent sequelae of small-vessel ischemic change.   The source CT angiogram images of the neck demonstrate multilevel cervical spondylosis most pronounced at the C5/6  and C6/7 levels.   MACRO: None   Signed by: Frank Raman 6/14/2024 11:11 AM Dictation workstation:   [id number]       No EEG results found for the past 14 days                 Assessment/Plan     1.  Expand All Collapse All    History Of Present Illness  Niurka Parada is a 73 y.o. female presenting with left facial droop left-sided upper and lower extremity weakness.  According the patient the symptoms started several hours ago and she woke up in the middle of the night and not feeling well and then she went back to bed and early morning yesterday she noticed that she had some facial weakness and left upper and lower extremities.  The time she called her daughter who is a nurse and came straight to the emergency room.  She was out for the window for TNK.  She had a CAT scan which was unremarkable and showed some area of infarction which were not clinically correlated.  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        Past Medical History  Medical History        Past Medical History:   Diagnosis Date    Anxiety      Depression      Diabetes mellitus (Multi)      Hypertension           Surgical History  Surgical History         Past Surgical History:   Procedure Laterality Date    CHOLECYSTECTOMY        TOTAL KNEE ARTHROPLASTY Right      TUBAL LIGATION             Social History  Social History           Tobacco Use    Smoking status: Never    Smokeless tobacco: Never   Vaping Use    Vaping status: Never Used   Substance Use Topics    Alcohol use: Not Currently     Drug use: Never      Allergies  Morphine  Home Medications  Prescriptions Prior to Admission           Medications Prior to Admission   Medication Sig Dispense Refill Last Dose    ALPRAZolam (Xanax) 0.5 mg tablet Take 1 tablet (0.5 mg) by mouth 3 times a day.     6/14/2024    latanoprost (Xalatan) 0.005 % ophthalmic solution Administer 1 drop into both eyes once daily at bedtime.     6/13/2024    meloxicam (Mobic) 15 mg tablet Take 1 tablet (15 mg) by mouth once daily as needed.     Past Week    metFORMIN (Glucophage) 1,000 mg tablet Take 1 tablet (1,000 mg) by mouth once daily with breakfast.     6/13/2024    metoprolol succinate XL (Toprol-XL) 50 mg 24 hr tablet Take 1 tablet (50 mg) by mouth once daily.     6/14/2024    naproxen (Naprosyn) 250 mg tablet Take 2 tablets (500 mg) by mouth every 6 hours if needed for mild pain (1 - 3).     6/13/2024    PARoxetine (Paxil) 40 mg tablet Take 1 tablet (40 mg) by mouth once daily.     6/13/2024    pseudoephedrine (Sudafed) 30 mg tablet Take 1 tablet (30 mg) by mouth 2 times a day as needed for congestion.     6/13/2024    ergocalciferol (Vitamin D-2) 1.25 MG (54082 UT) capsule Take 1 capsule (1,250 mcg) by mouth 1 (one) time per week. On Sunday 6/9/2024            Review of Systems   Neurological:  Positive for facial asymmetry, weakness and light-headedness.         Physical Exam  Shows mildly obese.  HEENT respiratory to be equal and react light ear nose and throat was unremarkable and left-sided facial droop     Neck was supple without any carotid bruits and without any lymphadenopathy     Cardiovascular examination normal S1-S2 with clear breath sounds bilaterally abdomen soft distended difficult to palpate with organomegaly     Extremities I did not reveal edema or any rash.  Neurological Exam  Patient was alert awake very pleasant and cooperative she was at her place person and time.  Speech was slightly dysarthric but she was able to follow simple commands.   "Memory was intact.  Attention span judgment concentration and focus was appropriate     No hallucinations or delusions.     Cranial revealed Cellosene with no positive the left side.  Extraocular motor intact had slight facial asymmetry and is from the palpebral fissure.  Tongue was in the midline and she was able to elevate the palate and shoulder without difficulties sensory exam was intact to light touch and pinprick and her hearing was normal     Motor exam revealed left hemiparesis with a power of 1-2 or 5 in the upper extremity and 2-3 or 5 in the left lower extremity     Reflexes were asymmetric slightly increased reflexes on the left compared to the right with plantar extensor response bilaterally     Coordination Station and gait were deferred and she was wheelchair-bound     Sensory exam was intact to light touch and pinprick.        Last Recorded Vitals  Blood pressure 126/59, pulse 69, temperature 35.8 °C (96.4 °F), resp. rate 18, height 1.676 m (5' 6\"), weight 88 kg (194 lb 0.1 oz), SpO2 96%.        Relevant Results  Recent Results         Recent Results (from the past 24 hour(s))   POCT GLUCOSE     Collection Time: 06/14/24  4:21 PM   Result Value Ref Range     POCT Glucose 119 (H) 74 - 99 mg/dL   POCT GLUCOSE     Collection Time: 06/14/24  7:57 PM   Result Value Ref Range     POCT Glucose 149 (H) 74 - 99 mg/dL   SST TOP     Collection Time: 06/15/24  5:43 AM   Result Value Ref Range     Extra Tube Hold for add-ons.     Magnesium     Collection Time: 06/15/24  5:44 AM   Result Value Ref Range     Magnesium 1.87 1.60 - 2.40 mg/dL   Basic Metabolic Panel     Collection Time: 06/15/24  5:44 AM   Result Value Ref Range     Glucose 124 (H) 74 - 99 mg/dL     Sodium 138 136 - 145 mmol/L     Potassium 3.8 3.5 - 5.3 mmol/L     Chloride 105 98 - 107 mmol/L     Bicarbonate 25 21 - 32 mmol/L     Anion Gap 12 10 - 20 mmol/L     Urea Nitrogen 17 6 - 23 mg/dL     Creatinine 1.06 (H) 0.50 - 1.05 mg/dL     eGFR 56 (L) " >60 mL/min/1.73m*2     Calcium 9.1 8.6 - 10.3 mg/dL   CBC     Collection Time: 06/15/24  5:44 AM   Result Value Ref Range     WBC 5.9 4.4 - 11.3 x10*3/uL     nRBC 0.0 0.0 - 0.0 /100 WBCs     RBC 3.73 (L) 4.00 - 5.20 x10*6/uL     Hemoglobin 8.6 (L) 12.0 - 16.0 g/dL     Hematocrit 29.9 (L) 36.0 - 46.0 %     MCV 80 80 - 100 fL     MCH 23.1 (L) 26.0 - 34.0 pg     MCHC 28.8 (L) 32.0 - 36.0 g/dL     RDW 16.4 (H) 11.5 - 14.5 %     Platelets 227 150 - 450 x10*3/uL   Type And Screen     Collection Time: 06/15/24  5:44 AM   Result Value Ref Range     ABO TYPE O       Rh TYPE POS       ANTIBODY SCREEN NEG     POCT GLUCOSE     Collection Time: 06/15/24  6:10 AM   Result Value Ref Range     POCT Glucose 136 (H) 74 - 99 mg/dL   POCT GLUCOSE     Collection Time: 06/15/24 12:25 PM   Result Value Ref Range     POCT Glucose 147 (H) 74 - 99 mg/dL         NIH Stroke Scale  1A. Level of Consciousness: Alert, Keenly Responsive  1B. Ask Month and Age: Both Questions Right  1C. Blink Eyes & Squeeze Hands: Performs Both Tasks  2. Best Gaze: Normal  3. Visual: No Visual Loss  4. Facial Palsy: Minor Paralysis  5A. Motor - Left Arm: Some Effort Against Gravity  5B. Motor - Right Arm: No Drift  6A. Motor - Left Leg: Some Effort Against Gravity  6B. Motor - Right Leg: No Drift  7. Limb Ataxia: Absent  8. Sensory Loss: Mild-to-Moderate Sensory Loss  9. Best Language: Mild-to-Moderate Aphasia  10. Dysarthria: Mild-to-Moderate Dysarthria  11. Extinction and Inattention: No Abnormality  NIH Stroke Scale: 8           Herman Coma Scale  Best Eye Response: Spontaneous  Best Verbal Response: Oriented  Best Motor Response: Follows commands  Herman Coma Scale Score: 15              CMP:          Results from last 7 days   Lab Units 06/15/24  0544 06/14/24  1047   SODIUM mmol/L 138 137   POTASSIUM mmol/L 3.8 4.3   CHLORIDE mmol/L 105 103   CO2 mmol/L 25 25   BUN mg/dL 17 18   CREATININE mg/dL 1.06* 1.20*   GLUCOSE mg/dL 124* 148*   PROTEIN TOTAL g/dL  --   6.5   CALCIUM mg/dL 9.1 9.2   BILIRUBIN TOTAL mg/dL  --  0.4   ALK PHOS U/L  --  111   AST U/L  --  18   ALT U/L  --  15         Lipid Panel:       Results from last 7 days   Lab Units 06/14/24  1048   HDL mg/dL 44.0   CHOLESTEROL/HDL RATIO   4.5   VLDL mg/dL 47*   TRIGLYCERIDES mg/dL 234*   NON HDL CHOL. mg/dL 153*               MR brain wo IV contrast     Result Date: 6/14/2024  Interpreted By:  Edson Dela Cruz, STUDY: MR BRAIN WO IV CONTRAST;  6/14/2024 6:28 pm   INDICATION: Signs/Symptoms:L sided upper and lower extremity weakness, L facial droop, dysarthria.   COMPARISON: None.   ACCESSION NUMBER(S): [id number]   ORDERING CLINICIAN: PEPE ANTHONY   TECHNIQUE: Axial T2, FLAIR, DWI, gradient echo T2 and sagittal and coronal T1 weighted images of brain were acquired.   FINDINGS: Subtle focus of diffusion restriction is present in the right anterior medulla (series 7, image 9) suspicious for a small infarct. Slight associated T2 hyperintense signal is present, likely representing mild vasogenic edema.   No additional areas of diffusion restriction are identified intracranially. No supratentorial infarcts are identified.   There is no evidence of intracranial hemorrhage. No mass effect or midline shift is present. No pathologic hemosiderin staining is identified.   No ventricular dilatation is present. Basal cisterns are patent. No extra-axial fluid collections are identified.   Visualized large arterial flow voids are preserved, including intracranial carotid and basilar arteries.   Scattered foci of hyperintense FLAIR and T2 signal changes are present in the periventricular and subcortical white matter of bilateral cerebral hemispheres, nonspecific findings favored to represent chronic sequela of microvascular disease.   Small amount of T2 signal is present in the right mastoid air cells. Paranasal sinuses do not demonstrate any abnormal signal abnormalities.        1. Small focus of diffusion restriction  "with associated T2 signal changes in the right anterior medulla suspicious for an acute to early subacute infarct. No additional areas of infarction are identified intracranially. 2. Scattered areas of hyperintense FLAIR and T2 signal present in the periventricular and subcortical white matter of bilateral cerebral hemispheres are nonspecific, but favored to represent chronic sequela of microvascular disease.   MACRO: None   Signed by: Edson Dela Cruz 6/14/2024 7:41 PM Dictation workstation:   [id number]   No CT head results found for the past 14 days  No echocardiogram results found for the past 14 days           Results from last 7 days   Lab Units 06/14/24  1048   HEMOGLOBIN A1C % 6.4*      No results found for: \"BNP\"     I have personally reviewed the following imaging results MR brain wo IV contrast     Result Date: 6/14/2024  Interpreted By:  Edson Dela Cruz, STUDY: MR BRAIN WO IV CONTRAST;  6/14/2024 6:28 pm   INDICATION: Signs/Symptoms:L sided upper and lower extremity weakness, L facial droop, dysarthria.   COMPARISON: None.   ACCESSION NUMBER(S): [id number]   ORDERING CLINICIAN: PEPE ANTHONY   TECHNIQUE: Axial T2, FLAIR, DWI, gradient echo T2 and sagittal and coronal T1 weighted images of brain were acquired.   FINDINGS: Subtle focus of diffusion restriction is present in the right anterior medulla (series 7, image 9) suspicious for a small infarct. Slight associated T2 hyperintense signal is present, likely representing mild vasogenic edema.   No additional areas of diffusion restriction are identified intracranially. No supratentorial infarcts are identified.   There is no evidence of intracranial hemorrhage. No mass effect or midline shift is present. No pathologic hemosiderin staining is identified.   No ventricular dilatation is present. Basal cisterns are patent. No extra-axial fluid collections are identified.   Visualized large arterial flow voids are preserved, including " intracranial carotid and basilar arteries.   Scattered foci of hyperintense FLAIR and T2 signal changes are present in the periventricular and subcortical white matter of bilateral cerebral hemispheres, nonspecific findings favored to represent chronic sequela of microvascular disease.   Small amount of T2 signal is present in the right mastoid air cells. Paranasal sinuses do not demonstrate any abnormal signal abnormalities.        1. Small focus of diffusion restriction with associated T2 signal changes in the right anterior medulla suspicious for an acute to early subacute infarct. No additional areas of infarction are identified intracranially. 2. Scattered areas of hyperintense FLAIR and T2 signal present in the periventricular and subcortical white matter of bilateral cerebral hemispheres are nonspecific, but favored to represent chronic sequela of microvascular disease.   MACRO: None   Signed by: Edson Dela Cruz 6/14/2024 7:41 PM Dictation workstation:   [id number]     CT brain attack head wo IV contrast     Result Date: 6/14/2024  Interpreted By:  Rayshawn Blandon, STUDY: CT BRAIN ATTACK HEAD WO IV CONTRAST;  6/14/2024 10:49 am   INDICATION: Signs/Symptoms:Stroke Evaluation.   COMPARISON: None.   ACCESSION NUMBER(S): [id number]   ORDERING CLINICIAN: ARDEN DELACRUZ   TECHNIQUE: Noncontrast axial CT scan of head was performed. Angled reformats in brain and bone windows and coronal and sagittal reconstructions were generated. The images were reviewed in bone, brain, blood and soft tissue windows.   FINDINGS: CSF Spaces: The ventricles, sulci and basal cisterns are within normal limits the patient's age. There is no extraaxial fluid collection.   Parenchyma: A roughly 2 x 1 x 1 cm mildly hypoattenuating focus in the left parietal white matter could be due to microangiopathy or an infarct of uncertain age, but does not correlate with the history of left-sided weakness.It could be further evaluated  with MRI including diffusion-weighted imaging if clinically indicated. The grey-white differentiation is intact. There is no midline shift, other mass effect or intracranial hemorrhage. Moderate bilateral carotid siphon and minimal distal right vertebral artery calcification is noted.   Calvarium and bone: The calvarium is unremarkable. Edentulous.   Paranasal sinuses and mastoids: The mastoid air cells and tympanic cavities are well developed and clear bilaterally. The visualized left maxillary sinus is hypoplastic and opacified with dense contents and bony wall thickening. This could be more completely evaluated with nonemergent sinus CT. An opacified left ethmoid air cell is unlikely to be clinically significant. The frontal sinus is not developed.        Left parietal white matter hypoattenuating focus as discussed above.   No evidence of intracranial hemorrhage or displaced skull fracture.   Hypoplastic opacified left maxillary sinus.   MACRO: Rayshawn Blandon MD discussed the significance and urgency of this critical finding by telephone with  ARDEN DELACRUZ on 6/14/2024 at 11:20 am.  (**-RCF-**) Findings:  See findings.     Signed by: Rayshawn Blandon 6/14/2024 11:22 AM Dictation workstation:   [id number]     ECG 12 lead     Result Date: 6/14/2024  Sinus rhythm with frequent Premature ventricular complexes Moderate voltage criteria for LVH, may be normal variant Cannot rule out Septal infarct , age undetermined Abnormal ECG No previous ECGs available See ED provider note for full interpretation and clinical correlation     CT brain attack angio head and neck W and WO IV contrast     Result Date: 6/14/2024  Interpreted By:  Frank Raman, STUDY: CT BRAIN ATTACK ANGIO HEAD AND NECK W AND WO IV CONTRAST; ; 6/14/2024 10:56 am   INDICATION: Signs/Symptoms:left side weakness, facial droop.   COMPARISON: None.   ACCESSION NUMBER(S): [id number]   ORDERING CLINICIAN: ARDEN DELACRUZ   TECHNIQUE: Thin cut  axial CT images were generated over the head prior to and during the arterial passage of a full contrast bolus.  The bolus was generated with a power injector and followed with immediate saline flush. These image data were subtracted and then used for 3-D reconstructions. Maximum intensity projections and shaded surface displays were generated in multiple planes. In addition images were transferred into a 3-D processing program and additional projections and displays were reviewed by the interpreting physician.   FINDINGS: The CT angiogram through the upper thorax demonstrates mild atherosclerotic calcifications along the aortic arch and origins of the right brachiocephalic artery and left subclavian artery. No significant stenosis noted along the origins of the right brachiocephalic artery, left common carotid, or left subclavian artery. No significant stenosis is noted along the origins of the vertebral arteries.   The CT images of the neck demonstrates atherosclerotic calcifications along the origin/proximal internal carotid arteries bilaterally contributing to mild non hemodynamically significant narrowing utilizing the more distal cervical segments of the internal carotid arteries as a point of reference. No significant stenosis is noted along the cervical segments of the vertebral arteries. There is a left dominant vertebral artery.   The CT angiogram of the head demonstrates a small caliber distal right vertebral artery which terminates in the right posterior inferior cerebellar artery. No significant stenosis is noted along the distal left dominant vertebral artery or basilar artery. Atherosclerotic calcifications are noted along the bilateral carotid siphons contributing to mild non hemodynamically significant narrowing. There is a hypoplastic A1 segment of the right anterior cerebral artery with the more distal right anterior cerebral artery supplied from the left via a patent anterior communicating  artery. Patent posterior communicating arteries are identified bilaterally. No large vessel branch cutoffs of the anterior cerebral arteries, middle cerebral arteries, or posterior cerebral arteries are noted. No intracranial aneurysm is noted.   The source CT angiogram images of the head demonstrate mild to moderate brain parenchymal volume loss. There are nonspecific white matter changes noted within cerebral hemispheres bilaterally which while nonspecific, given the patient's age, may represent sequelae of small-vessel ischemic change. There is no midline shift. There is opacification of an asymmetrically hypoplastic left maxillary sinus as well as opacification of a few left ethmoid air cells.   The source CT angiogram images of the neck demonstrate multilevel cervical spondylosis most pronounced at the C5/6 and C6/7 levels.          The CT angiogram through the upper thorax demonstrates mild atherosclerotic calcifications along the aortic arch and origins of the right brachiocephalic artery and left subclavian artery. No significant stenosis noted along the origins of the right brachiocephalic artery, left common carotid, or left subclavian artery. No significant stenosis is noted along the origins of the vertebral arteries.   The CT images of the neck demonstrates atherosclerotic calcifications along the origin/proximal internal carotid arteries bilaterally contributing to mild non hemodynamically significant narrowing utilizing the more distal cervical segments of the internal carotid arteries as a point of reference. No significant stenosis is noted along the cervical segments of the vertebral arteries. There is a left dominant vertebral artery.   The CT angiogram of the head demonstrates a small caliber distal right vertebral artery which terminates in the right posterior inferior cerebellar artery. No significant stenosis is noted along the distal left dominant vertebral artery or basilar artery.  Atherosclerotic calcifications are noted along the bilateral carotid siphons contributing to mild non hemodynamically significant narrowing. There is a hypoplastic A1 segment of the right anterior cerebral artery with the more distal right anterior cerebral artery supplied from the left via a patent anterior communicating artery. Patent posterior communicating arteries are identified bilaterally. No large vessel branch cutoffs of the anterior cerebral arteries, middle cerebral arteries, or posterior cerebral arteries are noted. No intracranial aneurysm is noted.   The source CT angiogram images of the head demonstrate mild to moderate brain parenchymal volume loss. There are nonspecific white matter changes noted within cerebral hemispheres bilaterally which while nonspecific, given the patient's age, may represent sequelae of small-vessel ischemic change.   The source CT angiogram images of the neck demonstrate multilevel cervical spondylosis most pronounced at the C5/6 and C6/7 levels.   MACRO: None   Signed by: Frank Raman 6/14/2024 11:11 AM Dictation workstation:   [id number]  .                  Assessment/Plan   1.  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     2.  Diabetes mellitus which is well-controlled.     3.  Hypertension     4.  Morbid obesity.     Plan.  Continue with aspirin which was started earlier and will hold off on Plavix since patient has low hemoglobin and with Hemoccult blood.  Patient will need a GI workup and when medically stable patient can start Plavix along with aspirin for dual antiplatelet medication.  Will defer that to up to GI and further workup.  Signs symptoms a risk factor for CVA bleeding risk and the precaution and the role of medication were discussed with patient's  two daughter who is a pharmacist and a nurse.  Continue with PT OT evaluation and social service looking for disposition     Due to technical  limitations of voice recognition and human error, this note may not accurately reflect the care of the patient.              NIHSS (worst at presentation):      Vascular Risk Factor modification goals:  Blood pressure goals: avoid hypotension SBP <100 that could worsen cerebral perfusion,   Lipid Goals: education on healthy diet and   Glucose Goals: early treatment of hyperglycemia to goal glucose 140-180 mg/dl with long-term goal A1c < 7%   Smoking Cessation and Education  Assessment for Rehabilitation needs   Patient and family education on signs and symptoms of stroke, calling 911, healthy strategies for stroke prevention              Due to technical limitations of voice recognition and human error, this note may not accurately reflect the care of the patient.     Dennys Buchanan MD

## 2024-06-16 NOTE — PROGRESS NOTES
Physical Therapy    Physical Therapy Treatment    Patient Name: Niurka Parada  MRN: [id number]  Today's Date: 6/16/2024  Time Calculation  Start Time: 1314  Stop Time: 1344  Time Calculation (min): 30 min     917/917-A    Assessment/Plan   End of Session Communication: Bedside nurse  Assessment Comment: Patient presents with good effort throughout todays session. Motivated and willing to participate in exercises. Receptive to cues and education on pt condition/importance of positioning. Call light and all needs in reach.  End of Session Patient Position: Alarm on, Bed, 3 rail up      General Visit Information:   PT  Visit  PT Received On: 06/16/24  General  Prior to Session Communication: Bedside nurse  Patient Position Received: Bed, 3 rail up, Alarm on  General Comment: Pt agreeable to therapy this date, visitors present at start of session.  Subjective     Precautions:  Precautions  Medical Precautions: Fall precautions (aspiration precautions)  Precautions Comment: Significant LUE and LLE weakness, GAIT BELT.       Objective     Pain:  Pain Assessment  Pain Assessment: 0-10  Pain Score: 0 - No pain    Cognition:  Cognition  Overall Cognitive Status: Within Functional Limits  Orientation Level: Oriented X4      Treatments:  Therapeutic Exercise  Therapeutic Exercise Performed: Yes  Therapeutic Exercise Activity 1: supine, RLE ankle pumps, GS, SAQ, SLR, heel slides with manual resistance, hip ABD/ADD/IR/ER  15x with VC/demo for proper performance. Focus on LE strength for improvement in standing tolerance and transfers. PROM performed on LLE for improved lubrication and joint integrity/ROM.                         Outcome Measures:  [facility] Basic Mobility  Turning from your back to your side while in a flat bed without using bedrails: A little  Moving from lying on your back to sitting on the side of a flat bed without using bedrails: A lot  Moving to and from bed to chair (including a wheelchair): Total  Standing  "up from a chair using your arms (e.g. wheelchair or bedside chair): Total  To walk in hospital room: Total  Climbing 3-5 steps with railing: Total  Basic Mobility - Total Score: 9  Education Documentation  Mobility Training, taught by Wendi Cantor PTA at 6/16/2024  2:31 PM.  Learner: Patient  Readiness: Acceptance  Method: Explanation, Demonstration  Response: Verbalizes Understanding, Needs Reinforcement    Education Comments  No comments found.        EDUCATION:  Outpatient Education  Education Comment: Education provided on therapy expectations, benefits of rehabilitation facilities, and proper positioning.  Encounter Problems       Encounter Problems (Active)       PT Problem       Pt will demonstrate sup > sit and sit > sup bed mobility min A (Not Progressing)       Start:  06/15/24    Expected End:  06/29/24            Pt will demo sit > stand and stand > sit transfer with least restrictive device and min A  (Not Progressing)       Start:  06/15/24    Expected End:  06/29/24            Pt will demo stand pivot transfer with least restrictive device and min A  (Not Progressing)       Start:  06/15/24    Expected End:  06/29/24            Pt will perform dynamic reaching activities while sitting EOB 4\" outside of GARTH with min A for balance (Not Progressing)       Start:  06/15/24    Expected End:  06/29/24            Pt will ambulate 5' with least restrictive device and mod A, without LOB  (Not Progressing)       Start:  06/15/24    Expected End:  06/29/24                   Pain - Adult              "

## 2024-06-17 ENCOUNTER — APPOINTMENT (OUTPATIENT)
Dept: CARDIOLOGY | Facility: HOSPITAL | Age: 74
DRG: 065 | End: 2024-06-17
Payer: MEDICARE

## 2024-06-17 VITALS
DIASTOLIC BLOOD PRESSURE: 57 MMHG | WEIGHT: 194 LBS | OXYGEN SATURATION: 94 % | BODY MASS INDEX: 31.18 KG/M2 | RESPIRATION RATE: 16 BRPM | HEART RATE: 77 BPM | TEMPERATURE: 97.2 F | HEIGHT: 66 IN | SYSTOLIC BLOOD PRESSURE: 111 MMHG

## 2024-06-17 LAB
AORTIC VALVE MEAN GRADIENT: 4 MMHG
AORTIC VALVE PEAK VELOCITY: 1.36 M/S
AV PEAK GRADIENT: 7.4 MMHG
AVA (PEAK VEL): 2.67 CM2
AVA (VTI): 2.33 CM2
EJECTION FRACTION APICAL 4 CHAMBER: 58.2
ERYTHROCYTE [DISTWIDTH] IN BLOOD BY AUTOMATED COUNT: 16.6 % (ref 11.5–14.5)
GLUCOSE BLD MANUAL STRIP-MCNC: 127 MG/DL (ref 74–99)
GLUCOSE BLD MANUAL STRIP-MCNC: 131 MG/DL (ref 74–99)
HCT VFR BLD AUTO: 30.9 % (ref 36–46)
HGB BLD-MCNC: 8.6 G/DL (ref 12–16)
HOLD SPECIMEN: NORMAL
HOLD SPECIMEN: NORMAL
LEFT ATRIUM VOLUME AREA LENGTH INDEX BSA: 22.2 ML/M2
LEFT VENTRICLE INTERNAL DIMENSION DIASTOLE: 4.73 CM (ref 3.5–6)
LEFT VENTRICULAR OUTFLOW TRACT DIAMETER: 2.04 CM
LV EJECTION FRACTION BIPLANE: 57 %
MCH RBC QN AUTO: 22.5 PG (ref 26–34)
MCHC RBC AUTO-ENTMCNC: 27.8 G/DL (ref 32–36)
MCV RBC AUTO: 81 FL (ref 80–100)
MITRAL VALVE E/A RATIO: 0.62
MITRAL VALVE E/E' RATIO: 6.61
NRBC BLD-RTO: 0.4 /100 WBCS (ref 0–0)
PLATELET # BLD AUTO: 186 X10*3/UL (ref 150–450)
RBC # BLD AUTO: 3.82 X10*6/UL (ref 4–5.2)
RIGHT VENTRICLE FREE WALL PEAK S': 15 CM/S
RIGHT VENTRICLE PEAK SYSTOLIC PRESSURE: 26.6 MMHG
THYROPEROXIDASE AB SERPL-ACNC: 49 IU/ML
TRICUSPID ANNULAR PLANE SYSTOLIC EXCURSION: 2 CM
WBC # BLD AUTO: 7.5 X10*3/UL (ref 4.4–11.3)

## 2024-06-17 PROCEDURE — 2500000004 HC RX 250 GENERAL PHARMACY W/ HCPCS (ALT 636 FOR OP/ED): Performed by: INTERNAL MEDICINE

## 2024-06-17 PROCEDURE — 93306 TTE W/DOPPLER COMPLETE: CPT

## 2024-06-17 PROCEDURE — 86376 MICROSOMAL ANTIBODY EACH: CPT | Mod: ELYLAB | Performed by: INTERNAL MEDICINE

## 2024-06-17 PROCEDURE — 97530 THERAPEUTIC ACTIVITIES: CPT | Mod: GP,CQ

## 2024-06-17 PROCEDURE — 99232 SBSQ HOSP IP/OBS MODERATE 35: CPT

## 2024-06-17 PROCEDURE — 2500000002 HC RX 250 W HCPCS SELF ADMINISTERED DRUGS (ALT 637 FOR MEDICARE OP, ALT 636 FOR OP/ED): Performed by: INTERNAL MEDICINE

## 2024-06-17 PROCEDURE — 2500000001 HC RX 250 WO HCPCS SELF ADMINISTERED DRUGS (ALT 637 FOR MEDICARE OP): Performed by: INTERNAL MEDICINE

## 2024-06-17 PROCEDURE — 82947 ASSAY GLUCOSE BLOOD QUANT: CPT | Mod: 91

## 2024-06-17 PROCEDURE — 36415 COLL VENOUS BLD VENIPUNCTURE: CPT | Performed by: INTERNAL MEDICINE

## 2024-06-17 PROCEDURE — 93306 TTE W/DOPPLER COMPLETE: CPT | Performed by: INTERNAL MEDICINE

## 2024-06-17 PROCEDURE — 85027 COMPLETE CBC AUTOMATED: CPT | Performed by: INTERNAL MEDICINE

## 2024-06-17 PROCEDURE — 97112 NEUROMUSCULAR REEDUCATION: CPT | Mod: GO

## 2024-06-17 PROCEDURE — 99239 HOSP IP/OBS DSCHRG MGMT >30: CPT | Performed by: INTERNAL MEDICINE

## 2024-06-17 RX ORDER — ATORVASTATIN CALCIUM 40 MG/1
40 TABLET, FILM COATED ORAL NIGHTLY
Start: 2024-06-17

## 2024-06-17 RX ORDER — LEVOTHYROXINE SODIUM 88 UG/1
88 TABLET ORAL DAILY
Start: 2024-06-18

## 2024-06-17 RX ORDER — LEVOTHYROXINE SODIUM 88 UG/1
88 TABLET ORAL DAILY
Status: DISCONTINUED | OUTPATIENT
Start: 2024-06-18 | End: 2024-06-17 | Stop reason: HOSPADM

## 2024-06-17 RX ORDER — ASPIRIN 81 MG/1
81 TABLET ORAL DAILY
Start: 2024-06-18

## 2024-06-17 RX ORDER — FERROUS SULFATE 325(65) MG
1 TABLET ORAL EVERY OTHER DAY
Start: 2024-06-18

## 2024-06-17 ASSESSMENT — PAIN - FUNCTIONAL ASSESSMENT
PAIN_FUNCTIONAL_ASSESSMENT: 0-10
PAIN_FUNCTIONAL_ASSESSMENT: 0-10

## 2024-06-17 ASSESSMENT — COGNITIVE AND FUNCTIONAL STATUS - GENERAL
MOVING TO AND FROM BED TO CHAIR: A LOT
HELP NEEDED FOR BATHING: A LOT
TURNING FROM BACK TO SIDE WHILE IN FLAT BAD: A LOT
CLIMB 3 TO 5 STEPS WITH RAILING: TOTAL
EATING MEALS: A LITTLE
DRESSING REGULAR UPPER BODY CLOTHING: A LOT
MOVING FROM LYING ON BACK TO SITTING ON SIDE OF FLAT BED WITH BEDRAILS: A LOT
MOBILITY SCORE: 11
DAILY ACTIVITIY SCORE: 11
PERSONAL GROOMING: A LOT
DRESSING REGULAR LOWER BODY CLOTHING: TOTAL
WALKING IN HOSPITAL ROOM: A LOT
STANDING UP FROM CHAIR USING ARMS: A LOT
TOILETING: TOTAL

## 2024-06-17 ASSESSMENT — PAIN SCALES - GENERAL
PAINLEVEL_OUTOF10: 0 - NO PAIN
PAINLEVEL_OUTOF10: 2
PAINLEVEL_OUTOF10: 2
PAINLEVEL_OUTOF10: 0 - NO PAIN

## 2024-06-17 ASSESSMENT — PAIN DESCRIPTION - DESCRIPTORS
DESCRIPTORS: SPASM
DESCRIPTORS: SPASM

## 2024-06-17 NOTE — PROGRESS NOTES
Physical Therapy    Physical Therapy Treatment    Patient Name: Niurka Parada  MRN: [id number]  Today's Date: 6/17/2024  Time Calculation  Start Time: 1123  Stop Time: 1205  Time Calculation (min): 42 min     [id number]/917-A    Assessment/Plan   End of Session Communication: Bedside nurse  Assessment Comment: Post session pt appeared to be sweating and possible diaphoretic. Pt seated in recliner with LEs elevated once session completed. Pt presented with good effort throughout todays session, participated in all tasks. Receptive to cues throughout with fair carry over. Call light and all needs in reach.  End of Session Patient Position: Up in chair, Alarm on      General Visit Information:   PT  Visit  PT Received On: 06/17/24  General  Co-Treat: with OT due to significant pt weakness/debility to maximize efficiency and safety during therapy session.  Prior to Session Communication: Bedside nurse  Patient Position Received: Bed, 3 rail up, Alarm on  General Comment: Pt agreeable to therapy this date, visitors present at start of session.  Subjective     Precautions:  Precautions  Medical Precautions: Fall precautions (aspiration precautions, GAIT BELT, significant L sided weakness.)  Precautions Comment: Significant LUE and LLE weakness, GAIT BELT.    Vital Signs:  Vital Signs  Heart Rate:  (90-98 throughout therapy session)  SpO2:  (98 post transfers)  Objective     Pain:  Pain Assessment  Pain Assessment: 0-10  Pain Score: 2  Pain Type: Acute pain  Pain Location: Leg  Pain Orientation: Left  Pain Descriptors: Spasm    Cognition:  Cognition  Overall Cognitive Status: Within Functional Limits  Orientation Level: Oriented X4      Treatments:     Balance/Neuromuscular Re-Education  Balance/Neuromuscular Re-Education Activity Performed: Yes  Balance/Neuromuscular Re-Education Activity 1: Pt performed static standing balance with FWW MaxA x2 with L knee block provided to prevent buckling due to weakness and quick fatigue. Pt  tolerated 2 minutes standing with two attempts. Light, anticipatory external pertubations provided first attempt to challenge balance and stabilty. Second attempt to performed lateral weight shifting for pre-gait training and improving WBing through LLE.  Bed Mobility  Bed Mobility: Yes  Bed Mobility 1  Bed Mobility 1: Supine to sitting  Level of Assistance 1: Maximum assistance, +2  Bed Mobility Comments 1: Pt performed supine<>EOB MaxA x2 with VC to use bedrails to lift trunk. Pt demonstrates ability to utilize hook method to bring BLE off edge of bed. Required full assist to scoot towards EOB. Slight posterior/left lean upon intial sitting.  Ambulation/Gait Training  Ambulation/Gait Training Performed: Yes  Ambulation/Gait Training 1  Surface 1: Level tile  Device 1: Rolling walker (FWW)  Gait Support Devices: Gait belt  Assistance 1: Maximum assistance (MaxA x2)  Quality of Gait 1: Wide base of support, Shuffling gait, Knee(s) buckle  Comments/Distance (ft) 1: Pt ambulated 1' with FWW, lateral side stepping MaxA x2. Pt demonstrates inabilty to weight shifting to clear feet from ground, heel toe walking on RLE to perform. Pt required L knee block to prevent buckling throughout. VC to sequening to improve gait mechanics. Poor AD management due to L UE weakness.  Transfers  Transfer: Yes  Transfer 1  Transfer From 1: Bed to  Transfer to 1: Chair with arms  Technique 1: Stand pivot  Transfer Device 1:  (no AD- to my person)  Transfer Level of Assistance 1: Maximum assistance, +2  Trials/Comments 1: Pt performed stand pivot from EOB<>chair MaxA x2. Pt requires VC for sequencing, poor eccentric control to sitting position. L knee block provided to prevent buckling.  Transfers 2  Transfer From 2: Bed to, Chair with arms to  Transfer to 2: Stand  Technique 2: Stand to sit, Sit to stand  Transfer Device 2: Walker (FWW)  Transfer Level of Assistance 2: Maximum assistance, +2  Trials/Comments 2: Pt performed STS 2x from  "EOB<>FWW and 2x from recliner<>FWW MaxA x2. VC required for sequencing with L knee block provded to prevent buckling. Assist required for proper foot placement and preventiong of rolling L ankle.          Outcome Measures:  [facility] Basic Mobility  Turning from your back to your side while in a flat bed without using bedrails: A lot  Moving from lying on your back to sitting on the side of a flat bed without using bedrails: A lot  Moving to and from bed to chair (including a wheelchair): A lot  Standing up from a chair using your arms (e.g. wheelchair or bedside chair): A lot  To walk in hospital room: A lot  Climbing 3-5 steps with railing: Total  Basic Mobility - Total Score: 11  Education Documentation  Mobility Training, taught by Wendi Cantor PTA at 6/16/2024  2:31 PM.  Learner: Patient  Readiness: Acceptance  Method: Explanation, Demonstration  Response: Verbalizes Understanding, Needs Reinforcement    Education Comments  No comments found.        EDUCATION:  Outpatient Education  Education Comment: Education provided on therapy expectations, benefits of rehabilitation facilities, and proper positioning.  Encounter Problems       Encounter Problems (Resolved)       PT Problem       Pt will demonstrate sup > sit and sit > sup bed mobility min A (Adequate for Discharge)       Start:  06/15/24    Expected End:  06/29/24    Resolved:  06/17/24         Pt will demo sit > stand and stand > sit transfer with least restrictive device and min A  (Adequate for Discharge)       Start:  06/15/24    Expected End:  06/29/24    Resolved:  06/17/24         Pt will demo stand pivot transfer with least restrictive device and min A  (Adequate for Discharge)       Start:  06/15/24    Expected End:  06/29/24    Resolved:  06/17/24         Pt will perform dynamic reaching activities while sitting EOB 4\" outside of GARTH with min A for balance (Adequate for Discharge)       Start:  06/15/24    Expected End:  06/29/24    Resolved:  " 06/17/24         Pt will ambulate 5' with least restrictive device and mod A, without LOB  (Adequate for Discharge)       Start:  06/15/24    Expected End:  06/29/24    Resolved:  06/17/24                Pain - Adult

## 2024-06-17 NOTE — CARE PLAN
The patient's goals for the shift include      The clinical goals for the shift include pt will remain safe from injury/ fall    Over the shift, the patient did not make progress toward the following goals. Barriers to progression include, pt left sided weakness is improving. Recommendations to address these barriers include continue to work with PT/OT and work on exercises.

## 2024-06-17 NOTE — CONSULTS
Inpatient consult to Endocrinology  Consult performed by: Ismael Mayer MD  Consult ordered by: Emory Peres MD          Assessment/Plan   Hypothyroidism uncontrolled  Noncompliance to medication levothyroxine     The patient has been noncompliant due to various causes to levothyroxine after explaining to her how important it was and what she should take which she agrees to take it at this time she will be increased her levothyroxine dose because of her extremely elevated TSH and symptoms of hypothyroidism including weakness tiredness fatigue and others    Reason For Consult  Hypothyroidism with elevated TSH    History Of Present Illness  Niurka Parada is a 73 y.o. female with  has a past medical history of Anxiety, Depression, Diabetes mellitus (Multi), and Hypertension. presenting with mental changes and stroke with weakness on the left side and also elevated TSH of 18.  The patient tells me that she has not been taking her thyroid medications for over 9 months she does not remember the doses but thought it was not important also possible that she has been depressed and not motivated due to losing her  in the recent past  She does feel weak tired.      Past Medical History  She has a past medical history of Anxiety, Depression, Diabetes mellitus (Multi), and Hypertension.    Surgical History  She has a past surgical history that includes Cholecystectomy; Total knee arthroplasty (Right); and Tubal ligation.     Social History  She reports that she has never smoked. She has never used smokeless tobacco. She reports that she does not currently use alcohol. She reports that she does not use drugs.    Family History  No family history on file.     Allergies  Morphine    Review of Systems  Awake alert in no distress  Left-sided weakness for arms and legs  Conversing adequately  Feels tired drained lethargic      Past Medical History:   Diagnosis Date    Anxiety     Depression     Diabetes mellitus  (Multi)     Hypertension        Past Surgical History:   Procedure Laterality Date    CHOLECYSTECTOMY      TOTAL KNEE ARTHROPLASTY Right     TUBAL LIGATION         Social History     Socioeconomic History    Marital status:      Spouse name: Not on file    Number of children: Not on file    Years of education: Not on file    Highest education level: Not on file   Occupational History    Not on file   Tobacco Use    Smoking status: Never    Smokeless tobacco: Never   Vaping Use    Vaping status: Never Used   Substance and Sexual Activity    Alcohol use: Not Currently    Drug use: Never    Sexual activity: Not Currently   Other Topics Concern    Not on file   Social History Narrative    Not on file     Social Determinants of Health     Financial Resource Strain: Low Risk  (6/15/2024)    Overall Financial Resource Strain (CARDIA)     Difficulty of Paying Living Expenses: Not hard at all   Food Insecurity: Unknown (1/31/2023)    Received from [facility]    Hunger Vital Sign     Worried About Running Out of Food in the Last Year: Patient declined     Ran Out of Food in the Last Year: Patient declined   Transportation Needs: No Transportation Needs (6/15/2024)    PRAPARE - Transportation     Lack of Transportation (Medical): No     Lack of Transportation (Non-Medical): No   Physical Activity: Insufficiently Active (1/31/2023)    Received from [facility]    Exercise Vital Sign     Days of Exercise per Week: 1 day     Minutes of Exercise per Session: 10 min   Stress: Stress Concern Present (1/31/2023)    Received from [facility]    [employer] - Occupational Stress Questionnaire     Feeling of Stress : Rather much   Social Connections: Socially Isolated (1/31/2023)    Received from [facility]    Social Connection and Isolation Panel [NHANES]     Frequency of Communication with Friends and Family: More than three times a week     Frequency of Social Gatherings with  "Friends and Family: Twice a week     Attends Baptism Services: Never     Active Member of Clubs or Organizations: No     Attends Club or Organization Meetings: Patient declined     Marital Status:    Intimate Partner Violence: Not on file   Housing Stability: Low Risk  (6/15/2024)    Housing Stability Vital Sign     Unable to Pay for Housing in the Last Year: No     Number of Places Lived in the Last Year: 1     Unstable Housing in the Last Year: No        Physical Exam  Awake alert in no distress conversing adequately  Left-sided weakness on body    ROS, PMH, FH/SH, surgical history and allergies have been reviewed.    Last Recorded Vitals  Blood pressure 111/57, pulse 77, temperature 36.2 °C (97.2 °F), resp. rate 16, height 1.676 m (5' 6\"), weight 88 kg (194 lb 0.1 oz), SpO2 94%.    Relevant Results  Results from last 7 days   Lab Units 06/17/24  1054 06/17/24  0615 06/16/24  1629 06/16/24  1116 06/16/24  0625 06/16/24  0617 06/15/24  0610 06/15/24  0544 06/14/24  1100 06/14/24  1047   POCT GLUCOSE mg/dL 131* 127* 131* 156*  --  122*   < >  --    < >  --    GLUCOSE mg/dL  --   --   --   --  135*  --   --  124*  --  148*    < > = values in this interval not displayed.     Lab Results   Component Value Date    HGBA1C 6.4 (H) 06/14/2024      Lab Results   Component Value Date    TSH 18.01 (H) 06/14/2024    FREET4 0.56 (L) 06/14/2024    [id number] 100 06/14/2024        Ector Mayer MD FACE  Office phone - [phone]  Fax - [phone]  Address: [address] [facility] [address]  Address: [address] [id number]  6/17/2024  11:25 AM  "

## 2024-06-17 NOTE — PROGRESS NOTES
"Niurka Parada is a 73 y.o. female on day 2 of admission presenting with Symptoms of cerebrovascular accident (CVA).      Subjective   Right brainstem infarct.  Pt. In bed, left facial droop remains, LUE weakness improving with some effort against gravity, LLE drift. She continues with aspirin and statin for stroke prevention. Plan is for acute rehab.  Review of systems are negative unless otherwise specified in HPI.       Objective     Last Recorded Vitals  Blood pressure 111/57, pulse 77, temperature 36.2 °C (97.2 °F), resp. rate 16, height 1.676 m (5' 6\"), weight 88 kg (194 lb 0.1 oz), SpO2 94%.    Physical Exam  HENT:      Right Ear: Hearing normal.      Left Ear: Hearing normal.   Psychiatric:         Speech: Speech normal.       Neurological Exam  Mental Status  Awake, alert and oriented to person, place and time. Recent and remote memory are intact. Speech is normal. Language is fluent with no aphasia. Attention and concentration are normal.    Cranial Nerves  CN III, IV, VI:   Right pupil: 3 mm. Round. Reactive to light. Reactive to accommodation.   Left pupil: 3 mm. Round. Reactive to light. Reactive to accommodation.  CN VII:  Right: There is no facial weakness.  Left: There is central facial weakness.  CN VIII:  Right: Hearing is normal.  Left: Hearing is normal.  And EOM's Normal.    Motor  Normal muscle bulk throughout. Normal muscle tone. Strength is 5/5 in all four extremities except as noted.  Left-sided weakness.      Relevant Results  Results for orders placed or performed during the hospital encounter of 06/14/24 (from the past 24 hour(s))   POCT GLUCOSE   Result Value Ref Range    POCT Glucose 156 (H) 74 - 99 mg/dL   POCT GLUCOSE   Result Value Ref Range    POCT Glucose 131 (H) 74 - 99 mg/dL   POCT GLUCOSE   Result Value Ref Range    POCT Glucose 127 (H) 74 - 99 mg/dL   Green Top   Result Value Ref Range    Extra Tube Hold for add-ons.    SST TOP   Result Value Ref Range    Extra Tube Hold for " add-ons.    CBC   Result Value Ref Range    WBC 7.5 4.4 - 11.3 x10*3/uL    nRBC 0.4 (H) 0.0 - 0.0 /100 WBCs    RBC 3.82 (L) 4.00 - 5.20 x10*6/uL    Hemoglobin 8.6 (L) 12.0 - 16.0 g/dL    Hematocrit 30.9 (L) 36.0 - 46.0 %    MCV 81 80 - 100 fL    MCH 22.5 (L) 26.0 - 34.0 pg    MCHC 27.8 (L) 32.0 - 36.0 g/dL    RDW 16.6 (H) 11.5 - 14.5 %    Platelets 186 150 - 450 x10*3/uL   Transthoracic Echo (TTE) Complete   Result Value Ref Range    AV mn grad 4.0 mmHg    AV pk ben 1.36 m/s    LV Biplane EF 57 %    LVOT diam 2.04 cm    MV E/A ratio 0.62     Tricuspid annular plane systolic excursion 2.0 cm    MV avg E/e' ratio 6.61     LA vol index A/L 22.2 ml/m2    RV free wall pk S' 15.00 cm/s    RVSP 26.6 mmHg    LVIDd 4.73 cm    Aortic Valve Area by Continuity of Peak Velocity 2.67 cm2    AV pk grad 7.4 mmHg    Aortic Valve Area by Continuity of VTI 2.33 cm2    LV A4C EF 58.2    Transthoracic Echo (TTE) Complete    Result Date: 6/17/2024          [place] [address]  Tel [phone] Fax [phone] TRANSTHORACIC ECHOCARDIOGRAM REPORT  Patient Name:      ROXY Orta Physician:    [id number] Frank Hall DO Study Date:        6/17/2024            Ordering Provider:    [id number] ANTHONY MRN/PID:           [id number]             Fellow: Accession#:        [id number]         Nurse:                Paulino Kaufman RN Date of Birth/Age: 1950 / 73      Sonographer:          Monique glynn                                      RASHAWN Gender:            F                    Additional Staff: Height:            167.64 cm            Admit Date:           6/14/2024 Weight:            88.00 kg             Admission Status:     Inpatient -                                                               Priority discharge BSA / BMI:          1.97 m2 / 31.31      Department Location:  [facility]                    kg/m2                                      Echo Lab Blood Pressure: 121 /67 mmHg Study Type:    TRANSTHORACIC ECHO (TTE) COMPLETE Diagnosis/ICD: Cerebral Infarction, unspecified-I63.9 Indication:    Cerebrovascular Accident CPT Codes:     Echo Complete w Full [id number] Patient History: Diabetes:          Yes Pertinent History: HTN and Hyperlipidemia. Study Detail: The following Echo studies were performed: 2D, M-Mode, Doppler and               color flow. Agitated saline used as a contrast agent for               intraseptal flow evaluation. The patient was awake.  PHYSICIAN INTERPRETATION: Left Ventricle: Left ventricular systolic function is normal, with an estimated ejection fraction of 55-60%. There are no regional wall motion abnormalities. The left ventricular cavity size is normal. The left ventricular septal wall thickness is mildly increased. Spectral Doppler shows an impaired relaxation pattern of left ventricular diastolic filling. LV Wall Scoring: All segments are normal. Left Atrium: The left atrium is mildly dilated. A bubble study using agitated saline was performed. Bubble study is negative. Right Ventricle: The right ventricle is normal in size. There is normal right ventricular global systolic function. Right Atrium: The right atrium is normal in size. Aortic Valve: The aortic valve appears structurally normal. The aortic valve appears tricuspid. There is mild aortic valve cusp calcification. There is no evidence of aortic valve stenosis. There is no evidence of aortic valve regurgitation. The peak instantaneous gradient of the aortic valve is 7.4 mmHg. The mean gradient of the aortic valve is 4.0 mmHg. Mitral Valve: The mitral valve is normal in structure. There is no evidence of mitral valve stenosis. There is normal mitral valve leaflet mobility. There is mild mitral valve regurgitation. Tricuspid Valve: The  tricuspid valve is structurally normal. There is normal tricuspid valve leaflet mobility. There is trace tricuspid regurgitation. Pulmonic Valve: The pulmonic valve is structurally normal. There is no indication of pulmonic valve regurgitation. Pericardium: There is a trivial pericardial effusion. Aorta: The aortic root is normal. Pulmonary Artery: The main pulmonary artery is normal in size, and position, with normal bifurcation into the left and right pulmonary arteries. Systemic Veins: The inferior vena cava appears to be of normal size. In comparison to the previous echocardiogram(s): There are no prior studies on this patient for comparison purposes.  CONCLUSIONS:  1. Left ventricular systolic function is normal with a 55-60% estimated ejection fraction.  2. Spectral Doppler shows an impaired relaxation pattern of left ventricular diastolic filling.  3. There is no evidence of mitral valve stenosis.  4. Mild mitral valve regurgitation.  5. Trace tricuspid regurgitation is visualized.  6. Aortic valve stenosis is not present.  7. The main pulmonary artery is normal in size, and position, with normal bifurcation into the left and right pulmonary arteries. RECOMMENDATIONS: Transthoracic echo has low negative predictive value for mass, vegetation, or embolic source. Consider JOSE LUIS or MRI if clinically indicated.  QUANTITATIVE DATA SUMMARY: 2D MEASUREMENTS:                           Normal Ranges: Ao Root d:     2.98 cm    (2.0-3.7cm) LAs:           3.96 cm    (2.7-4.0cm) IVSd:          1.39 cm    (0.6-1.1cm) LVPWd:         1.05 cm    (0.6-1.1cm) LVIDd:         4.73 cm    (3.9-5.9cm) LVIDs:         3.30 cm LV Mass Index: 111.0 g/m2 LV % FS        30.2 % LA VOLUME:                               Normal Ranges: LA Vol A4C:        45.2 ml    (22+/-6mL/m2) LA Vol A2C:        38.3 ml LA Vol BP:         43.9 ml LA Vol Index A4C:  22.9ml/m2 LA Vol Index A2C:  19.4 ml/m2 LA Vol Index BP:   22.2 ml/m2 LA Area A4C:       17.3  cm2 LA Area A2C:       15.1 cm2 LA Major Axis A4C: 5.6 cm LA Major Axis A2C: 5.1 cm LA Volume Index:   20.1 ml/m2 RA VOLUME BY A/L METHOD:                               Normal Ranges: RA Vol A4C:        29.6 ml    (8.3-19.5ml) RA Vol Index A4C:  15.0 ml/m2 RA Area A4C:       12.4 cm2 RA Major Axis A4C: 4.4 cm AORTA MEASUREMENTS:                    Normal Ranges: Asc Ao, d: 3.15 cm (2.1-3.4cm) LV SYSTOLIC FUNCTION BY 2D PLANIMETRY (MOD):                     Normal Ranges: EF-A4C View: 58.2 % (>=55%) EF-A2C View: 56.7 % EF-Biplane:  57.2 % LV DIASTOLIC FUNCTION:                            Normal Ranges: MV Peak E:      0.67 m/s   (0.7-1.2 m/s) MV Peak A:      1.09 m/s   (0.42-0.7 m/s) E/A Ratio:      0.62       (1.0-2.2) MV e'           0.10 m/s   (>8.0) MV lateral e'   0.10 m/s MV medial e'    0.06 m/s E/e' Ratio:     6.61       (<8.0) PulmV Sys Laron:  52.90 cm/s PulmV Resendez Laron: 27.70 cm/s PulmV S/D Laron:  1.90 MITRAL VALVE:                 Normal Ranges: MV DT: 332 msec (150-240msec) AORTIC VALVE:                                   Normal Ranges: AoV Vmax:                1.36 m/s (<=1.7m/s) AoV Peak P.4 mmHg (<20mmHg) AoV Mean P.0 mmHg (1.7-11.5mmHg) LVOT Max Laron:            1.11 m/s (<=1.1m/s) AoV VTI:                 29.10 cm (18-25cm) LVOT VTI:                20.70 cm LVOT Diameter:           2.04 cm  (1.8-2.4cm) AoV Area, VTI:           2.33 cm2 (2.5-5.5cm2) AoV Area,Vmax:           2.67 cm2 (2.5-4.5cm2) AoV Dimensionless Index: 0.71  RIGHT VENTRICLE: RV Basal 3.02 cm RV Mid   2.65 cm RV Major 5.9 cm TAPSE:   19.6 mm RV s'    0.15 m/s TRICUSPID VALVE/RVSP:                             Normal Ranges: Peak TR Velocity: 2.43 m/s RV Syst Pressure: 26.6 mmHg (< 30mmHg) IVC Diam:         1.72 cm PULMONIC VALVE:                         Normal Ranges: PV Accel Time: 95 msec  (>120ms) PV Max Laron:    0.9 m/s  (0.6-0.9m/s) PV Max PG:     3.5 mmHg Pulmonary Veins: PulmV Resendez Laron: 27.70 cm/s PulmV  S/D Laron:  1.90 PulmV Sys Laron:  52.90 cm/s  [id number] Frank Hall DO Electronically signed on 6/17/2024 at 9:53:43 AM  Wall Scoring  ** Final **    Scheduled medications   Medication Dose Route Frequency    aspirin  81 mg oral Daily    atorvastatin  40 mg oral Nightly    ferrous sulfate (325 mg ferrous sulfate)  1 tablet oral Every other day    heparin (porcine)  5,000 Units subcutaneous q8h    insulin lispro  0-10 Units subcutaneous TID    latanoprost  1 drop Both Eyes Nightly    levothyroxine  50 mcg oral Daily    metoprolol succinate XL  50 mg oral Daily    PARoxetine  40 mg oral Daily    perflutren lipid microspheres  0.5-10 mL of dilution intravenous Once in imaging    perflutren protein A microsphere  0.5 mL intravenous Once in imaging    sulfur hexafluoride microsphr  2 mL intravenous Once in imaging     PRN medications   Medication    acetaminophen    ALPRAZolam    dextrose    dextrose    glucagon    glucagon    hydrALAZINE    ondansetron    oxygen           NIH Stroke Scale  1A. Level of Consciousness: Alert, Keenly Responsive  1B. Ask Month and Age: Both Questions Right  1C. Blink Eyes & Squeeze Hands: Performs Both Tasks  2. Best Gaze: Normal  3. Visual: No Visual Loss  4. Facial Palsy: Minor Paralysis  5A. Motor - Left Arm: Some Effort Against Gravity  5B. Motor - Right Arm: No Drift  6A. Motor - Left Leg: Drift  6B. Motor - Right Leg: No Drift  7. Limb Ataxia: Absent  8. Sensory Loss: Mild-to-Moderate Sensory Loss  9. Best Language: Mild-to-Moderate Aphasia  10. Dysarthria: Mild-to-Moderate Dysarthria  11. Extinction and Inattention: No Abnormality  NIH Stroke Scale: 7           Arabi Coma Scale  Best Eye Response: Spontaneous  Best Verbal Response: Oriented  Best Motor Response: Follows commands  Herman Coma Scale Score: 15                             Assessment/Plan      Principal Problem:    Symptoms of cerebrovascular accident (CVA)  Active Problems:    Symptoms of cerebrovascular  accident    Impression:  Acute left-sided upper and lower extremities most likely right brainstem infarct most likely thromboembolic   Diabetes mellitus which is well-controlled -HgbA1c 6.4  Hypertension   Morbid obesity   Recommendations:   Continue with aspirin which was started earlier and will hold off on Plavix since patient has low hemoglobin and with hemoccult blood. Patient will need a GI workup and when medically stable patient can start Plavix along with aspirin for dual antiplatelet medications up to 3 months. Will defer that to up to GI and further workup.     Discussed bleeding precautions with patient while on anti platelet and/or anticoagulation therapy. Discussed stroke prevention such as: HgbA1c <7, tight blood pressure control <130/<80, LDL <70 with statin therapy (if indicated), CPAP/BiPAP compliance (if indicated) and lifestyle modification (Mediterranean diet, daily exercise, nicotine cessation & limiting alcohol use).   Discussed s/sx of stroke such as: face drooping, arm/leg weakness, speech difficulty; sudden numbness of face/extremity, sudden confusion, sudden trouble seeing, sudden trouble walking, sudden severe headache, dizziness, loss of balance or coordination and to call 911 immediately.?          I spent 35 minutes in the professional and overall care of this patient.      Fay Marino, DARCI-CNP

## 2024-06-17 NOTE — PROGRESS NOTES
Pd today to [facility] per dr Peres. Pt accepted. Notified [facility] of pending discharge today. Will arrange transport once discharge orders are complete. Pt will requires an ambulance d/t Significant LUE and LLE weakness, retropulsive , high fall risk 2/2 acute cva. Discussed w/ RN and pt.     Dsc sent facility final orders/Gilbert evans Pt transport arranged:  [facility] Vehicle you requested for Niurka KARINE in unit/room 917 on 06/17/2024 is scheduled to arrive at 1:00pm EDT! [facility] is handling this ride and you can contact them at [phone]. RN Unit secy pt. And [facility] liaison notified. Alfonzo pacheco contacted and may left w/ info re: discharge.

## 2024-06-17 NOTE — PROGRESS NOTES
Occupational Therapy    Occupational Therapy Treatment    Name: Niurka Parada  MRN: [id number]  : 1950  Date: 24  Time Calculation  Start Time: 1123  Stop Time: 1205  Time Calculation (min): 42 min    Assessment:  OT Assessment: Pt. demonstrates improved control of LUE and LLE today.  Pt. is now able to fully open L digits, actively.  Pt. has been working on LUE/bimanual activities during her down time.  End of Session Communication: Bedside nurse  End of Session Patient Position: Up in chair, Alarm on (LEs elevated, chair back reclined, LUE positioned for safety, all needs in reach.)  Plan:  Treatment Interventions: ADL retraining, Functional transfer training, UE strengthening/ROM, Endurance training, Patient/family training, Equipment evaluation/education, Neuromuscular reeducation, Fine motor coordination activities, Compensatory technique education  OT Frequency: 4 times per week  OT Discharge Recommendations: High intensity level of continued care  OT - OK to Discharge: Yes (when medically appropriate)    Subjective   Previous Visit Info:  OT Last Visit  OT Received On: 24  General:  General  Co-Treatment:  (PTA)  Co-Treatment Reason: To maximize therapeutic benefit and safety.  Prior to Session Communication: Bedside nurse  Patient Position Received: Bed, 3 rail up, Alarm on  General Comment: Pt. agreeable to therapy.  States that she has been working on her L arm.  Precautions:  Medical Precautions: Fall precautions  Precautions Comment: Significant LUE and LLE weakness.  Support LUE at all times.  GAIT BELT.  Vitals:  Vital Signs  Heart Rate:  (90-98 throughout therapy session)  SpO2:  (98% post transfers.  Room air.)  Patient Position:  (Pt. began sweating in standing, needed to sit. Assisted pt. to recline in chair, and elevated LEs. Possible diaphoresis.)  Pain Assessment:  Pain Assessment  Pain Assessment: 0-10  Pain Score: 2  Pain Type: Acute pain  Pain Location: Leg  Pain  Orientation: Left  Pain Descriptors: Spasm     Objective   Cognition:  Overall Cognitive Status: Within Functional Limits  Orientation Level: Oriented X4  Activities of Daily Living:      LE Dressing  Pants Level of Assistance: Dependent  Sock Level of Assistance: Maximum assistance  LE Dressing Where Assessed:  (Sit/stand at EOB)  LE Dressing Comments: Seated EOB.  OT threaded socks over toes.  Pt. used R hand to pull socks up over feet and lower legs with LUE in WB position on bed. PTA assisting with dynamic sitting balance (mod to max assist balance).  OT and pt. worked together to thread underwear over feet.  Pt. used RUE to pull underwear up on R and L hand to pull underwear up to thighs on L with max assist and verbal cues to look at L hand and grasp before pulling.  Dependent for underwear over hips in standing with max assist of 2nd person for standing and balance.       Bed Mobility/Transfers: Bed Mobility  Bed Mobility:  (2 max assist sup to sit.  Assist with LEs, trunk and scooting to EOB.)    Transfers  Transfer: Yes  Transfer 1  Transfer Device 1: Gait belt  Transfer Level of Assistance 1: Maximum assistance, +2  Trials/Comments 1: Sit to stand and stand to sit at EOB x 2. (Assist to support LUE.  L knee/hip blocked, assist with boost, balance, descent.  Cues for RUE placement. Cues for controlling descent, squeezing thighs and buttucks.)  Transfers 2  Transfer Device 2: Gait belt, Walker  Transfer Level of Assistance 2: Maximum assistance, +2  Trials/Comments 2: Sit to stand and stand to sit at recliner x 2. Assist to support LUE. L hand on L knee (with assist) for boost and descent. Cues to lift LUE/hand (actively) onto/off of walker handle, position hand properly and squeeze handle/release handle. L knee/hip blocked, assist with boost, balance, descent. Cues for RUE placements. Cues for controlling descent, squeezing thighs and buttucks.  Transfers 3  Transfer Device 3: Gait belt  Transfer Level of  Assistance 3: Maximum verbal cues, +2  Trials/Comments 3: Stand/step transfer bed to chair.  Assist to boost, balance, weight shift, advance LEs, manage LUE, block L knee and hip, cues for R hand placements, assist to descend. Cues for standing fully upright prior to attempting to take steps to chair. ~ 4-5 steps bed to chair with chair next to bed.       Sitting Balance:  Dynamic Sitting Balance  Dynamic Sitting-Balance:  (Poor (+))  Dynamic Sitting-Comments: Pt. leans L and posteriorly, and tilts head to R.  Corrects briefly with verbal and tactile cues.  Standing Balance:  Dynamic Standing Balance  Dynamic Standing-Balance:  (L lean and R head tilt, poor balance)  Dynamic Standing-Comments: Stood at FWW with bilat. UEs on walker.  Focused on midline, upright, bilat. LE and bilat. UE weight bearing.  Weight shifting L/R.  Cues and assist to keep L hand grasp on walker handle, verbal and tactile cues and assist for L hip/knee extension, upright posture.  Assist for positioning of L foot 2nd to tone. Initially could not get L heel to floor, however progressed to full foot on floor.      Therapeutic Activity  Therapeutic Activity 1: Once up in chair, LUE positioned on blanket rolls to support shoulder, with towel roll under hand to assist with positioning of wrist and hand.      Outcome Measures:  [facility] Daily Activity  Putting on and taking off regular lower body clothing: Total  Bathing (including washing, rinsing, drying): A lot  Putting on and taking off regular upper body clothing: A lot  Toileting, which includes using toilet, bedpan or urinal: Total  Taking care of personal grooming such as brushing teeth: A lot  Eating Meals: A little  Daily Activity - Total Score: 11        Education Documentation  Body Mechanics, taught by Wendi Williamson OT at 6/17/2024  2:17 PM.  Learner: Patient  Readiness: Acceptance  Method: Explanation  Response: Verbalizes Understanding    Precautions, taught by Wendi DONOVAN  Clay OT at 6/17/2024  2:17 PM.  Learner: Patient  Readiness: Acceptance  Method: Explanation  Response: Verbalizes Understanding    ADL Training, taught by Wendi Williamson OT at 6/17/2024  2:17 PM.  Learner: Patient  Readiness: Acceptance  Method: Explanation  Response: Verbalizes Understanding    Education Comments  No comments found.      Goals:  Encounter Problems        OT Goals         Fair dynamic sitting balance for ADL.          Start:  06/15/24    Expected End:  06/29/24              Poor (+) dynamic standing balance for ADL.          Start:  06/15/24    Expected End:  06/29/24              LUE MMT 3+/5 for ADL and functional mobility.          Start:  06/15/24    Expected End:  06/29/24              Mod assist sit/stand, bed/chair commode transfers.          Start:  06/15/24    Expected End:  06/29/24              Use LUE as functional assistant for all functional tasks, without cues.          Start:  06/15/24    Expected End:  06/29/24

## 2024-06-17 NOTE — DISCHARGE SUMMARY
DISCHARGE DIAGNOSIS     Acute R medullary ischemic stroke   Iron deficiency anemia  Hypothyroidism  HTN    HOSPITAL COURSE AND DETAILS     Acute R medullary ischemic stroke   -p/w L sided UE and LE weakness, numbness, L facial droop, slurred speech  -was out of window for thrombolytic therapy  -CT head noncon showed possible L parietal hypo-attenuating focus, although does not correlate with her presenting symptoms  -MRI brain done, showed acute infarct in R anterior medulla  -CTA H+N neg for LVO  -EKG did not show Afib, no Afib on tele  -TTE unremarkable  -A1C, lipid panel reviewed  -cont ASA 81, high intensity statin. Will avoid DAPT due to NIHSS >5 and iron deficiency anemia. Needs outpt GI scopes  -seen by neurology here, fu as outpt  -fu with PCP  -stable for dc to acute rehab     Iron deficiency anemia - Hgb 9.2 on admission, previous baseline in 2023 was normal Hgb. No overt bleeding. Reportedly hemooccult positive in ED. Iron studies show deficiency. Hgb 8.6 today, stable. Would not scope here inpatient anyway given acute ischemic stroke. Got IV iron here, started on PO supplement now. Stop NSAIDs. Will need outpt fu with GI, placed referral.     Hypothyroidism - TSH 18.01, FT4 0.56, total T3 wnl. Was reportedly on thyroid supplement in the past, but stopped for the past several months. Restarted on levothyroxine 88mcg here. Endocrinology saw, rec to cont this dose, fu in clinic in 2 weeks.     Stable for dc to acute rehab. Total time spent on discharge services 36 minutes.     DISCHARGE PHYSICAL EXAM     Last Recorded Vitals:  Vitals:    06/16/24 2045 06/16/24 2354 06/17/24 0450 06/17/24 0939   BP: 124/59 131/58 121/67 111/57   BP Location:       Patient Position:       Pulse: 78 69 75 77   Resp: 16 16 16    Temp: 36.3 °C (97.3 °F) 36.6 °C (97.9 °F) 36.5 °C (97.7 °F) 36.2 °C (97.2 °F)   TempSrc: Temporal Temporal Temporal    SpO2: 95% 97% 94%    Weight:       Height:           Physical Exam:  GEN: appears  stated age, NAD  CV: RRR, no m/r/g, no LE edema  LUNGS: CTAB, no w/r/c  ABD: soft, NT, obese  SKIN: no rashes  MSK; no gross deformities, normal joints  NEURO: A+Ox3, L facial droop, LUE and LLE 3/5 strength, sensation to light touch dull throughout shawn and lle  PSYCH: appropriate mood, affect      PERTINENT LABS AND IMAGING     Results for orders placed or performed during the hospital encounter of 06/14/24 (from the past 96 hour(s))   CBC and Auto Differential   Result Value Ref Range    WBC 5.7 4.4 - 11.3 x10*3/uL    nRBC 0.0 0.0 - 0.0 /100 WBCs    RBC 4.02 4.00 - 5.20 x10*6/uL    Hemoglobin 9.2 (L) 12.0 - 16.0 g/dL    Hematocrit 31.9 (L) 36.0 - 46.0 %    MCV 79 (L) 80 - 100 fL    MCH 22.9 (L) 26.0 - 34.0 pg    MCHC 28.8 (L) 32.0 - 36.0 g/dL    RDW 16.1 (H) 11.5 - 14.5 %    Platelets 262 150 - 450 x10*3/uL    Neutrophils % 49.7 40.0 - 80.0 %    Immature Granulocytes %, Automated 0.2 0.0 - 0.9 %    Lymphocytes % 38.5 13.0 - 44.0 %    Monocytes % 6.5 2.0 - 10.0 %    Eosinophils % 4.2 0.0 - 6.0 %    Basophils % 0.9 0.0 - 2.0 %    Neutrophils Absolute 2.81 1.60 - 5.50 x10*3/uL    Immature Granulocytes Absolute, Automated 0.01 0.00 - 0.50 x10*3/uL    Lymphocytes Absolute 2.18 0.80 - 3.00 x10*3/uL    Monocytes Absolute 0.37 0.05 - 0.80 x10*3/uL    Eosinophils Absolute 0.24 0.00 - 0.40 x10*3/uL    Basophils Absolute 0.05 0.00 - 0.10 x10*3/uL   Comprehensive metabolic panel   Result Value Ref Range    Glucose 148 (H) 74 - 99 mg/dL    Sodium 137 136 - 145 mmol/L    Potassium 4.3 3.5 - 5.3 mmol/L    Chloride 103 98 - 107 mmol/L    Bicarbonate 25 21 - 32 mmol/L    Anion Gap 13 10 - 20 mmol/L    Urea Nitrogen 18 6 - 23 mg/dL    Creatinine 1.20 (H) 0.50 - 1.05 mg/dL    eGFR 48 (L) >60 mL/min/1.73m*2    Calcium 9.2 8.6 - 10.3 mg/dL    Albumin 4.2 3.4 - 5.0 g/dL    Alkaline Phosphatase 111 33 - 136 U/L    Total Protein 6.5 6.4 - 8.2 g/dL    AST 18 9 - 39 U/L    Bilirubin, Total 0.4 0.0 - 1.2 mg/dL    ALT 15 7 - 45 U/L    Troponin I, High Sensitivity   Result Value Ref Range    Troponin I, High Sensitivity 5 0 - 13 ng/L   Protime-INR   Result Value Ref Range    Protime 11.4 9.8 - 12.8 seconds    INR 1.0 0.9 - 1.1   APTT   Result Value Ref Range    aPTT 29 27 - 38 seconds   Reticulocytes   Result Value Ref Range    Retic % 2.4 (H) 0.5 - 2.0 %    Retic Absolute 0.095 0.017 - 0.110 x10*6/uL    Reticulocyte Hemoglobin 21 (L) 28 - 38 pg    Immature Retic fraction 33.3 (H) <=16.0 %   Green Top   Result Value Ref Range    Extra Tube Hold for add-ons.    Lavender Top   Result Value Ref Range    Extra Tube Hold for add-ons.    SST TOP   Result Value Ref Range    Extra Tube Hold for add-ons.    Lipid Panel   Result Value Ref Range    Cholesterol 197 0 - 199 mg/dL    HDL-Cholesterol 44.0 mg/dL    Cholesterol/HDL Ratio 4.5     LDL Calculated 106 (H) <=99 mg/dL    VLDL 47 (H) 0 - 40 mg/dL    Triglycerides 234 (H) 0 - 149 mg/dL    Non HDL Cholesterol 153 (H) 0 - 149 mg/dL   Hemoglobin A1C   Result Value Ref Range    Hemoglobin A1C 6.4 (H) see below %    Estimated Average Glucose 137 Not Established mg/dL   Iron and TIBC   Result Value Ref Range    Iron 18 (L) 35 - 150 ug/dL    UIBC >450 (H) 110 - 370 ug/dL    TIBC      % Saturation     Ferritin   Result Value Ref Range    Ferritin 14 8 - 150 ng/mL   TSH with reflex to Free T4 if abnormal   Result Value Ref Range    Thyroid Stimulating Hormone 18.01 (H) 0.44 - 3.98 mIU/L   Vitamin B12   Result Value Ref Range    Vitamin B12 298 211 - 911 pg/mL   Folate   Result Value Ref Range    Folate, Serum 8.5 >5.0 ng/mL   Thyroxine, Free   Result Value Ref Range    Thyroxine, Free 0.56 (L) 0.61 - 1.12 ng/dL   Triiodothyronine, Total   Result Value Ref Range    Triiodothyronine 100 60 - 200 ng/dL   POCT glucose   Result Value Ref Range    POCT Glucose 132 (A) 74 - 99 mg/dL   POCT GLUCOSE   Result Value Ref Range    POCT Glucose 132 (H) 74 - 99 mg/dL   ECG 12 lead   Result Value Ref Range    Ventricular Rate  74 BPM    Atrial Rate 74 BPM    PA Interval 182 ms    QRS Duration 86 ms    QT Interval 416 ms    QTC Calculation(Bazett) 461 ms    P Axis 27 degrees    R Axis -11 degrees    T Axis -5 degrees    QRS Count 12 beats    Q Onset 225 ms    P Onset 134 ms    P Offset 195 ms    T Offset 433 ms    QTC Fredericia 446 ms   POCT GLUCOSE   Result Value Ref Range    POCT Glucose 119 (H) 74 - 99 mg/dL   POCT GLUCOSE   Result Value Ref Range    POCT Glucose 149 (H) 74 - 99 mg/dL   SST TOP   Result Value Ref Range    Extra Tube Hold for add-ons.    Magnesium   Result Value Ref Range    Magnesium 1.87 1.60 - 2.40 mg/dL   Basic Metabolic Panel   Result Value Ref Range    Glucose 124 (H) 74 - 99 mg/dL    Sodium 138 136 - 145 mmol/L    Potassium 3.8 3.5 - 5.3 mmol/L    Chloride 105 98 - 107 mmol/L    Bicarbonate 25 21 - 32 mmol/L    Anion Gap 12 10 - 20 mmol/L    Urea Nitrogen 17 6 - 23 mg/dL    Creatinine 1.06 (H) 0.50 - 1.05 mg/dL    eGFR 56 (L) >60 mL/min/1.73m*2    Calcium 9.1 8.6 - 10.3 mg/dL   CBC   Result Value Ref Range    WBC 5.9 4.4 - 11.3 x10*3/uL    nRBC 0.0 0.0 - 0.0 /100 WBCs    RBC 3.73 (L) 4.00 - 5.20 x10*6/uL    Hemoglobin 8.6 (L) 12.0 - 16.0 g/dL    Hematocrit 29.9 (L) 36.0 - 46.0 %    MCV 80 80 - 100 fL    MCH 23.1 (L) 26.0 - 34.0 pg    MCHC 28.8 (L) 32.0 - 36.0 g/dL    RDW 16.4 (H) 11.5 - 14.5 %    Platelets 227 150 - 450 x10*3/uL   Type And Screen   Result Value Ref Range    ABO TYPE O     Rh TYPE POS     ANTIBODY SCREEN NEG    POCT GLUCOSE   Result Value Ref Range    POCT Glucose 136 (H) 74 - 99 mg/dL   POCT GLUCOSE   Result Value Ref Range    POCT Glucose 147 (H) 74 - 99 mg/dL   POCT GLUCOSE   Result Value Ref Range    POCT Glucose 119 (H) 74 - 99 mg/dL   POCT GLUCOSE   Result Value Ref Range    POCT Glucose 164 (H) 74 - 99 mg/dL   POCT GLUCOSE   Result Value Ref Range    POCT Glucose 131 (H) 74 - 99 mg/dL   POCT GLUCOSE   Result Value Ref Range    POCT Glucose 122 (H) 74 - 99 mg/dL   Magnesium   Result Value  Ref Range    Magnesium 1.94 1.60 - 2.40 mg/dL   Basic Metabolic Panel   Result Value Ref Range    Glucose 135 (H) 74 - 99 mg/dL    Sodium 138 136 - 145 mmol/L    Potassium 3.7 3.5 - 5.3 mmol/L    Chloride 105 98 - 107 mmol/L    Bicarbonate 25 21 - 32 mmol/L    Anion Gap 12 10 - 20 mmol/L    Urea Nitrogen 17 6 - 23 mg/dL    Creatinine 1.02 0.50 - 1.05 mg/dL    eGFR 58 (L) >60 mL/min/1.73m*2    Calcium 9.1 8.6 - 10.3 mg/dL   CBC   Result Value Ref Range    WBC 5.9 4.4 - 11.3 x10*3/uL    nRBC 0.3 (H) 0.0 - 0.0 /100 WBCs    RBC 3.81 (L) 4.00 - 5.20 x10*6/uL    Hemoglobin 8.6 (L) 12.0 - 16.0 g/dL    Hematocrit 30.5 (L) 36.0 - 46.0 %    MCV 80 80 - 100 fL    MCH 22.6 (L) 26.0 - 34.0 pg    MCHC 28.2 (L) 32.0 - 36.0 g/dL    RDW 16.4 (H) 11.5 - 14.5 %    Platelets 186 150 - 450 x10*3/uL   SST TOP   Result Value Ref Range    Extra Tube Hold for add-ons.    POCT GLUCOSE   Result Value Ref Range    POCT Glucose 156 (H) 74 - 99 mg/dL   POCT GLUCOSE   Result Value Ref Range    POCT Glucose 131 (H) 74 - 99 mg/dL   POCT GLUCOSE   Result Value Ref Range    POCT Glucose 127 (H) 74 - 99 mg/dL   Green Top   Result Value Ref Range    Extra Tube Hold for add-ons.    SST TOP   Result Value Ref Range    Extra Tube Hold for add-ons.    CBC   Result Value Ref Range    WBC 7.5 4.4 - 11.3 x10*3/uL    nRBC 0.4 (H) 0.0 - 0.0 /100 WBCs    RBC 3.82 (L) 4.00 - 5.20 x10*6/uL    Hemoglobin 8.6 (L) 12.0 - 16.0 g/dL    Hematocrit 30.9 (L) 36.0 - 46.0 %    MCV 81 80 - 100 fL    MCH 22.5 (L) 26.0 - 34.0 pg    MCHC 27.8 (L) 32.0 - 36.0 g/dL    RDW 16.6 (H) 11.5 - 14.5 %    Platelets 186 150 - 450 x10*3/uL   Transthoracic Echo (TTE) Complete   Result Value Ref Range    AV mn grad 4.0 mmHg    AV pk ben 1.36 m/s    LV Biplane EF 57 %    LVOT diam 2.04 cm    MV E/A ratio 0.62     Tricuspid annular plane systolic excursion 2.0 cm    MV avg E/e' ratio 6.61     LA vol index A/L 22.2 ml/m2    RV free wall pk S' 15.00 cm/s    RVSP 26.6 mmHg    LVIDd 4.73 cm     Aortic Valve Area by Continuity of Peak Velocity 2.67 cm2    AV pk grad 7.4 mmHg    Aortic Valve Area by Continuity of VTI 2.33 cm2    LV A4C EF 58.2    POCT GLUCOSE   Result Value Ref Range    POCT Glucose 131 (H) 74 - 99 mg/dL        Transthoracic Echo (TTE) Complete   Final Result      MR brain wo IV contrast   Final Result   1. Small focus of diffusion restriction with associated T2 signal   changes in the right anterior medulla suspicious for an acute to   early subacute infarct. No additional areas of infarction are   identified intracranially.   2. Scattered areas of hyperintense FLAIR and T2 signal present in the   periventricular and subcortical white matter of bilateral cerebral   hemispheres are nonspecific, but favored to represent chronic sequela   of microvascular disease.        MACRO:   None        Signed by: Edson Dela Cruz 6/14/2024 7:41 PM   Dictation workstation:   [id number]      CT brain attack angio head and neck W and WO IV contrast   Final Result   The CT angiogram through the upper thorax demonstrates mild   atherosclerotic calcifications along the aortic arch and origins of   the right brachiocephalic artery and left subclavian artery. No   significant stenosis noted along the origins of the right   brachiocephalic artery, left common carotid, or left subclavian   artery. No significant stenosis is noted along the origins of the   vertebral arteries.        The CT images of the neck demonstrates atherosclerotic calcifications   along the origin/proximal internal carotid arteries bilaterally   contributing to mild non hemodynamically significant narrowing   utilizing the more distal cervical segments of the internal carotid   arteries as a point of reference. No significant stenosis is noted   along the cervical segments of the vertebral arteries. There is a   left dominant vertebral artery.        The CT angiogram of the head demonstrates a small caliber distal   right vertebral  artery which terminates in the right posterior   inferior cerebellar artery. No significant stenosis is noted along   the distal left dominant vertebral artery or basilar artery.   Atherosclerotic calcifications are noted along the bilateral carotid   siphons contributing to mild non hemodynamically significant   narrowing. There is a hypoplastic A1 segment of the right anterior   cerebral artery with the more distal right anterior cerebral artery   supplied from the left via a patent anterior communicating artery.   Patent posterior communicating arteries are identified bilaterally.   No large vessel branch cutoffs of the anterior cerebral arteries,   middle cerebral arteries, or posterior cerebral arteries are noted.   No intracranial aneurysm is noted.        The source CT angiogram images of the head demonstrate mild to   moderate brain parenchymal volume loss. There are nonspecific white   matter changes noted within cerebral hemispheres bilaterally which   while nonspecific, given the patient's age, may represent sequelae of   small-vessel ischemic change.        The source CT angiogram images of the neck demonstrate multilevel   cervical spondylosis most pronounced at the C5/6 and C6/7 levels.        MACRO:   None        Signed by: Frank Raman 6/14/2024 11:11 AM   Dictation workstation:   [id number]      CT brain attack head wo IV contrast   Final Result   Left parietal white matter hypoattenuating focus as discussed above.        No evidence of intracranial hemorrhage or displaced skull fracture.        Hypoplastic opacified left maxillary sinus.        MACRO:   Rayshawn Blandon MD discussed the significance and urgency of this   critical finding by telephone with  ARDEN DELACRUZ on 6/14/2024 at   11:20 am.  (**-RCF-**) Findings:  See findings.             Signed by: Rayshawn Blandon 6/14/2024 11:22 AM   Dictation workstation:   [id number]          Transthoracic Echo (TTE) Complete    Result Date:  6/17/2024          [address] [address]  Tel [phone] Fax [phone] TRANSTHORACIC ECHOCARDIOGRAM REPORT  Patient Name:      ROXY HIGHTOWER MARLON Orta Physician:    [id number] Frank Hall DO Study Date:        6/17/2024            Ordering Provider:    [id number] PEPE                                                               RAJ MRN/PID:           [id number]             Fellow: Accession#:        [id number]         Nurse:                Paulino Kaufman RN Date of Birth/Age: 1950 / 73      Sonographer:          Monique glynn                                      RDRASHAWN Gender:            F                    Additional Staff: Height:            167.64 cm            Admit Date:           6/14/2024 Weight:            88.00 kg             Admission Status:     Inpatient -                                                               Priority discharge BSA / BMI:         1.97 m2 / 31.31      Department Location:  [place] [address]                                      Echo Lab Blood Pressure: 121 /67 mmHg Study Type:    TRANSTHORACIC ECHO (TTE) COMPLETE Diagnosis/ICD: Cerebral Infarction, unspecified-I63.9 Indication:    Cerebrovascular Accident CPT Codes:     Echo Complete w Full [id number] Patient History: Diabetes:          Yes Pertinent History: HTN and Hyperlipidemia. Study Detail: The following Echo studies were performed: 2D, M-Mode, Doppler and               color flow. Agitated saline used as a contrast agent for               intraseptal flow evaluation. The patient was awake.  PHYSICIAN INTERPRETATION: Left Ventricle: Left ventricular systolic function is normal, with an estimated ejection fraction of 55-60%. There are no regional wall motion abnormalities. The left ventricular cavity size is normal. The left ventricular septal wall thickness is  mildly increased. Spectral Doppler shows an impaired relaxation pattern of left ventricular diastolic filling. LV Wall Scoring: All segments are normal. Left Atrium: The left atrium is mildly dilated. A bubble study using agitated saline was performed. Bubble study is negative. Right Ventricle: The right ventricle is normal in size. There is normal right ventricular global systolic function. Right Atrium: The right atrium is normal in size. Aortic Valve: The aortic valve appears structurally normal. The aortic valve appears tricuspid. There is mild aortic valve cusp calcification. There is no evidence of aortic valve stenosis. There is no evidence of aortic valve regurgitation. The peak instantaneous gradient of the aortic valve is 7.4 mmHg. The mean gradient of the aortic valve is 4.0 mmHg. Mitral Valve: The mitral valve is normal in structure. There is no evidence of mitral valve stenosis. There is normal mitral valve leaflet mobility. There is mild mitral valve regurgitation. Tricuspid Valve: The tricuspid valve is structurally normal. There is normal tricuspid valve leaflet mobility. There is trace tricuspid regurgitation. Pulmonic Valve: The pulmonic valve is structurally normal. There is no indication of pulmonic valve regurgitation. Pericardium: There is a trivial pericardial effusion. Aorta: The aortic root is normal. Pulmonary Artery: The main pulmonary artery is normal in size, and position, with normal bifurcation into the left and right pulmonary arteries. Systemic Veins: The inferior vena cava appears to be of normal size. In comparison to the previous echocardiogram(s): There are no prior studies on this patient for comparison purposes.  CONCLUSIONS:  1. Left ventricular systolic function is normal with a 55-60% estimated ejection fraction.  2. Spectral Doppler shows an impaired relaxation pattern of left ventricular diastolic filling.  3. There is no evidence of mitral valve stenosis.  4. Mild mitral  valve regurgitation.  5. Trace tricuspid regurgitation is visualized.  6. Aortic valve stenosis is not present.  7. The main pulmonary artery is normal in size, and position, with normal bifurcation into the left and right pulmonary arteries. RECOMMENDATIONS: Transthoracic echo has low negative predictive value for mass, vegetation, or embolic source. Consider JOSE LUIS or MRI if clinically indicated.  QUANTITATIVE DATA SUMMARY: 2D MEASUREMENTS:                           Normal Ranges: Ao Root d:     2.98 cm    (2.0-3.7cm) LAs:           3.96 cm    (2.7-4.0cm) IVSd:          1.39 cm    (0.6-1.1cm) LVPWd:         1.05 cm    (0.6-1.1cm) LVIDd:         4.73 cm    (3.9-5.9cm) LVIDs:         3.30 cm LV Mass Index: 111.0 g/m2 LV % FS        30.2 % LA VOLUME:                               Normal Ranges: LA Vol A4C:        45.2 ml    (22+/-6mL/m2) LA Vol A2C:        38.3 ml LA Vol BP:         43.9 ml LA Vol Index A4C:  22.9ml/m2 LA Vol Index A2C:  19.4 ml/m2 LA Vol Index BP:   22.2 ml/m2 LA Area A4C:       17.3 cm2 LA Area A2C:       15.1 cm2 LA Major Axis A4C: 5.6 cm LA Major Axis A2C: 5.1 cm LA Volume Index:   20.1 ml/m2 RA VOLUME BY A/L METHOD:                               Normal Ranges: RA Vol A4C:        29.6 ml    (8.3-19.5ml) RA Vol Index A4C:  15.0 ml/m2 RA Area A4C:       12.4 cm2 RA Major Axis A4C: 4.4 cm AORTA MEASUREMENTS:                    Normal Ranges: Asc Ao, d: 3.15 cm (2.1-3.4cm) LV SYSTOLIC FUNCTION BY 2D PLANIMETRY (MOD):                     Normal Ranges: EF-A4C View: 58.2 % (>=55%) EF-A2C View: 56.7 % EF-Biplane:  57.2 % LV DIASTOLIC FUNCTION:                            Normal Ranges: MV Peak E:      0.67 m/s   (0.7-1.2 m/s) MV Peak A:      1.09 m/s   (0.42-0.7 m/s) E/A Ratio:      0.62       (1.0-2.2) MV e'           0.10 m/s   (>8.0) MV lateral e'   0.10 m/s MV medial e'    0.06 m/s E/e' Ratio:     6.61       (<8.0) PulmV Sys Laron:  52.90 cm/s PulmV Resendez Laron: 27.70 cm/s PulmV S/D Laron:  1.90 MITRAL VALVE:                  Normal Ranges: MV DT: 332 msec (150-240msec) AORTIC VALVE:                                   Normal Ranges: AoV Vmax:                1.36 m/s (<=1.7m/s) AoV Peak P.4 mmHg (<20mmHg) AoV Mean P.0 mmHg (1.7-11.5mmHg) LVOT Max Laron:            1.11 m/s (<=1.1m/s) AoV VTI:                 29.10 cm (18-25cm) LVOT VTI:                20.70 cm LVOT Diameter:           2.04 cm  (1.8-2.4cm) AoV Area, VTI:           2.33 cm2 (2.5-5.5cm2) AoV Area,Vmax:           2.67 cm2 (2.5-4.5cm2) AoV Dimensionless Index: 0.71  RIGHT VENTRICLE: RV Basal 3.02 cm RV Mid   2.65 cm RV Major 5.9 cm TAPSE:   19.6 mm RV s'    0.15 m/s TRICUSPID VALVE/RVSP:                             Normal Ranges: Peak TR Velocity: 2.43 m/s RV Syst Pressure: 26.6 mmHg (< 30mmHg) IVC Diam:         1.72 cm PULMONIC VALVE:                         Normal Ranges: PV Accel Time: 95 msec  (>120ms) PV Max Laron:    0.9 m/s  (0.6-0.9m/s) PV Max PG:     3.5 mmHg Pulmonary Veins: PulmV Resendez Laron: 27.70 cm/s PulmV S/D Laron:  1.90 PulmV Sys Laron:  52.90 cm/s  [id number] Frank Hall DO Electronically signed on 2024 at 9:53:43 AM  Wall Scoring  ** Final **      DISCHARGE MEDICATIONS        Your medication list        START taking these medications        Instructions Last Dose Given Next Dose Due   aspirin 81 mg EC tablet  Start taking on: 2024      Take 1 tablet (81 mg) by mouth once daily.       atorvastatin 40 mg tablet  Commonly known as: Lipitor      Take 1 tablet (40 mg) by mouth once daily at bedtime.       ferrous sulfate (325 mg ferrous sulfate) tablet  Start taking on: 2024      Take 1 tablet by mouth every other day.       levothyroxine 88 mcg tablet  Commonly known as: Synthroid, Levoxyl  Start taking on: 2024      Take 1 tablet (88 mcg) by mouth early in the morning.. Take on an empty stomach at the same time each day, either 30 to 60 minutes prior to breakfast Do not fill before ,  2024.              CONTINUE taking these medications        Instructions Last Dose Given Next Dose Due   ALPRAZolam 0.5 mg tablet  Commonly known as: Xanax           ergocalciferol 1.25 MG (48314 UT) capsule  Commonly known as: Vitamin D-2           latanoprost 0.005 % ophthalmic solution  Commonly known as: Xalatan           metFORMIN 1,000 mg tablet  Commonly known as: Glucophage           metoprolol succinate XL 50 mg 24 hr tablet  Commonly known as: Toprol-XL           PARoxetine 40 mg tablet  Commonly known as: Paxil           pseudoephedrine 30 mg tablet  Commonly known as: Sudafed                  STOP taking these medications      meloxicam 15 mg tablet  Commonly known as: Mobic        naproxen 250 mg tablet  Commonly known as: Naprosyn                  Where to Get Your Medications        Information about where to get these medications is not yet available    Ask your nurse or doctor about these medications  aspirin 81 mg EC tablet  atorvastatin 40 mg tablet  ferrous sulfate (325 mg ferrous sulfate) tablet  levothyroxine 88 mcg tablet         OUTPATIENT FOLLOW-UP     No future appointments.

## 2024-07-15 NOTE — PROGRESS NOTES
Subjective   Niurka Parada is a 73 y.o.   female. [facility] 6-14-24, Dr. Buchanan, here with dtr  HPI  PMH of DM2, is here being seen s/p hospitalization for stroke-like symptoms of LUE/LLE weakness, numbness, left facial droop and slurred speech. Stroke workup was initiated with aspirin and HIT. MRI brain revealed right anterior medulla suspicious for an acute to early subacute infarct. CTA demonstrates a wide array of atherosclerotic disease. TTE unremarkable. HgbA1c 8.0, . She was not started on Plavix due to low Hgb with GI consult for hemoccult blood. Will re-evaluate at this visit. She has followed up with her PCP. She has not followed up with GI.   Today, she is doing well. She went to [facility] 3 weeks for rehab and continues with PT/OT in the home. She does have some left hand sensory changes and LLE weakness. She continues with aspirin and statin. Denies falls at home.   Review of systems are negative unless otherwise specified in HPI.   Objective   Neurological Exam  Mental Status  Awake, alert and oriented to person, place and time. Recent and remote memory are intact. Speech is normal. Language is fluent with no aphasia. Attention and concentration are normal.    Cranial Nerves  CN II: Right visual acuity: Counts fingers. Left visual acuity: Counts fingers. Right normal visual field. Left normal visual field.  CN III, IV, VI: Extraocular movements intact bilaterally. No nystagmus.   Right pupil: 3 mm. Round. Reactive to light. Reactive to accommodation.   Left pupil: 3 mm. Round. Reactive to light. Reactive to accommodation.  CN V:  Right: Facial sensation is normal.  Left: Facial sensation is normal on the left.  CN VII:  Right: There is no facial weakness.  Left: There is no facial weakness.  CN VIII:  Right: Hearing is normal.  Left: Hearing is normal.  CN IX, X:  Right: Palate is normal.  Left: Palate is normal.  CN XI:  Right: Trapezius strength is normal.  Left: Trapezius strength is  normal.  CN XII: Tongue midline without atrophy or fasciculations.    Motor  Normal muscle bulk throughout. Normal muscle tone. Strength is 5/5 in all four extremities except as noted.  Left sided weakness.    Sensory  Light touch is normal in upper and lower extremities.     Reflexes  Deep tendon reflexes are 2+ and symmetric in all four extremities.    Coordination  Right: Finger-to-nose normal.Left: Finger-to-nose normal.    Gait  Casual gait: Ataxic gait.    Physical Exam  HENT:      Right Ear: Hearing normal.      Left Ear: Hearing normal.   Eyes:      Extraocular Movements: EOM normal. No nystagmus.   Neurological:      Deep Tendon Reflexes: Reflexes are normal and symmetric.   Psychiatric:         Speech: Speech normal.           Assessment/Plan   Follow up in 3 months. Advised to follow up with GI (anemia vs. GIB), will defer Plavix until GI w/u and follow up with PCP (HgbA1, handicap place card). Will reassess driving capabilities at the next appt.   Discussed bleeding precautions with patient while on anti platelet and/or anticoagulation therapy. Discussed stroke prevention such as: HgbA1c <7, tight blood pressure control <130/<80, LDL <70 with statin therapy (if indicated), CPAP/BiPAP compliance (if indicated) and lifestyle modification (Mediterranean diet, daily exercise, nicotine cessation & limiting alcohol use).   Discussed s/sx of stroke such as: face drooping, arm/leg weakness, speech difficulty; sudden numbness of face/extremity, sudden confusion, sudden trouble seeing, sudden trouble walking, sudden severe headache, dizziness, loss of balance or coordination and to call 911 immediately.?

## 2024-07-16 ENCOUNTER — APPOINTMENT (OUTPATIENT)
Dept: NEUROLOGY | Facility: CLINIC | Age: 74
End: 2024-07-16
Payer: MEDICARE

## 2024-07-16 VITALS
WEIGHT: 190.4 LBS | BODY MASS INDEX: 32.5 KG/M2 | DIASTOLIC BLOOD PRESSURE: 84 MMHG | HEART RATE: 81 BPM | SYSTOLIC BLOOD PRESSURE: 118 MMHG | HEIGHT: 64 IN

## 2024-07-16 DIAGNOSIS — I63.9 ACUTE CVA (CEREBROVASCULAR ACCIDENT) (MULTI): Primary | ICD-10-CM

## 2024-07-16 PROCEDURE — 1159F MED LIST DOCD IN RCRD: CPT

## 2024-07-16 PROCEDURE — 3008F BODY MASS INDEX DOCD: CPT

## 2024-07-16 PROCEDURE — 1160F RVW MEDS BY RX/DR IN RCRD: CPT

## 2024-07-16 PROCEDURE — 99215 OFFICE O/P EST HI 40 MIN: CPT

## 2024-07-16 PROCEDURE — 1111F DSCHRG MED/CURRENT MED MERGE: CPT

## 2024-07-16 RX ORDER — MELOXICAM 15 MG/1
15 TABLET ORAL DAILY
COMMUNITY

## 2024-07-16 ASSESSMENT — PATIENT HEALTH QUESTIONNAIRE - PHQ9
1. LITTLE INTEREST OR PLEASURE IN DOING THINGS: NOT AT ALL
2. FEELING DOWN, DEPRESSED OR HOPELESS: SEVERAL DAYS
SUM OF ALL RESPONSES TO PHQ9 QUESTIONS 1 & 2: 1
10. IF YOU CHECKED OFF ANY PROBLEMS, HOW DIFFICULT HAVE THESE PROBLEMS MADE IT FOR YOU TO DO YOUR WORK, TAKE CARE OF THINGS AT HOME, OR GET ALONG WITH OTHER PEOPLE: SOMEWHAT DIFFICULT

## 2024-07-16 ASSESSMENT — VISUAL ACUITY: METHOD_CF: 1

## 2024-07-16 NOTE — PATIENT INSTRUCTIONS
Continue with daily aspirin and statin. Continue with PT/OT. Follow up with GI (anemia vs. GIB) and PCP (HgbA1, handicap place card)    Discussed bleeding precautions with patient while on anti platelet and/or anticoagulation therapy. Discussed stroke prevention such as: HgbA1c <7, tight blood pressure control <130/<80, LDL <70 with statin therapy (if indicated), CPAP/BiPAP compliance (if indicated) and lifestyle modification (Mediterranean diet, daily exercise, nicotine cessation & limiting alcohol use).   Discussed s/sx of stroke such as: face drooping, arm/leg weakness, speech difficulty; sudden numbness of face/extremity, sudden confusion, sudden trouble seeing, sudden trouble walking, sudden severe headache, dizziness, loss of balance or coordination and to call 911 immediately.?

## 2024-07-17 DIAGNOSIS — B36.9 FUNGAL DERMATITIS: Primary | ICD-10-CM

## 2024-07-17 RX ORDER — NYSTATIN 100000 [USP'U]/G
1 POWDER TOPICAL 2 TIMES DAILY
Qty: 100 G | Refills: 0 | Status: SHIPPED | OUTPATIENT
Start: 2024-07-17 | End: 2025-07-17

## 2024-07-18 ENCOUNTER — APPOINTMENT (OUTPATIENT)
Dept: NEUROLOGY | Facility: CLINIC | Age: 74
End: 2024-07-18
Payer: MEDICARE

## 2024-10-07 NOTE — PROGRESS NOTES
Subjective   Niurka Parada is a 73 y.o.   female.  HPI  The patient is being seen after being hospitalized for right anterior medulla stroke on 6-14-24.? They have left sided sensory loss and weakness residual deficit. They are currently taking asa and statin and are doing well. Patient denies any extensive bruising or bleeding while taking this medication. Recent hospitalizations: August 2024 for Anemia.   Today, she continues with N&T in left arm, hand, ankle and knee. LLE weakness is improving. She had 1 fall since last visit, was sitting in the dark, got up and tripped. She was not injured. She is using a cane for ambulation. She has finished her PT in the home. She will follow up with her PCP in November.   Neuro exam is normal. I have reviewed the medications and the chart.  Review of systems are negative unless otherwise specified in HPI.    Objective   Neurological Exam  Mental Status  Awake, alert and oriented to person, place and time. Speech is normal. Language is fluent with no aphasia. Attention and concentration are normal.    Cranial Nerves  CN III, IV, VI: Pupils equal round and reactive to light bilaterally.  And EOM's Normal.    Motor   Strength is 5/5 throughout all four extremities.    Sensory  Light touch abnormality: Reduced sensation-75% LUE, 90% LLE.     Reflexes  Deep tendon reflexes are 2+ and symmetric in all four extremities.    Gait  Casual gait: Narrow stance. Reduced stride length.    Physical Exam  Eyes:      Pupils: Pupils are equal, round, and reactive to light.   Neurological:      Motor: Motor strength is normal.     Deep Tendon Reflexes: Reflexes are normal and symmetric.   Psychiatric:         Speech: Speech normal.           Assessment/Plan   #CVA with vascular risk factors of DM2    Continue with current regimen. Follow up in 6 months.  Discussed bleeding precautions with patient while on anti platelet and/or anticoagulation therapy. Discussed stroke prevention such as:  HgbA1c <7, tight blood pressure control <130/<80, LDL <70 with statin therapy (if indicated), CPAP/BiPAP compliance (if indicated) and lifestyle modification (Mediterranean diet, daily exercise, nicotine cessation & limiting alcohol use).   Discussed s/sx of stroke such as: face drooping, arm/leg weakness, speech difficulty; sudden numbness of face/extremity, sudden confusion, sudden trouble seeing, sudden trouble walking, sudden severe headache, dizziness, loss of balance or coordination and to call 911 immediately.?

## 2024-10-21 ENCOUNTER — APPOINTMENT (OUTPATIENT)
Dept: NEUROLOGY | Facility: CLINIC | Age: 74
End: 2024-10-21
Payer: MEDICARE

## 2024-10-21 VITALS
HEIGHT: 64 IN | SYSTOLIC BLOOD PRESSURE: 136 MMHG | DIASTOLIC BLOOD PRESSURE: 80 MMHG | BODY MASS INDEX: 31.96 KG/M2 | WEIGHT: 187.2 LBS | HEART RATE: 81 BPM

## 2024-10-21 DIAGNOSIS — R09.89 SYMPTOMS OF CEREBROVASCULAR ACCIDENT (CVA): Primary | ICD-10-CM

## 2024-10-21 PROCEDURE — 3008F BODY MASS INDEX DOCD: CPT

## 2024-10-21 PROCEDURE — 1159F MED LIST DOCD IN RCRD: CPT

## 2024-10-21 PROCEDURE — 1036F TOBACCO NON-USER: CPT

## 2024-10-21 PROCEDURE — 99214 OFFICE O/P EST MOD 30 MIN: CPT

## 2024-10-21 PROCEDURE — 1160F RVW MEDS BY RX/DR IN RCRD: CPT

## 2024-10-21 RX ORDER — HYDROCHLOROTHIAZIDE 25 MG/1
1 TABLET ORAL
COMMUNITY
Start: 2024-10-09

## 2024-10-21 RX ORDER — PANTOPRAZOLE SODIUM 40 MG/1
1 TABLET, DELAYED RELEASE ORAL
COMMUNITY
Start: 2024-08-19

## 2024-10-21 ASSESSMENT — PATIENT HEALTH QUESTIONNAIRE - PHQ9
2. FEELING DOWN, DEPRESSED OR HOPELESS: MORE THAN HALF THE DAYS
SUM OF ALL RESPONSES TO PHQ9 QUESTIONS 1 & 2: 2
10. IF YOU CHECKED OFF ANY PROBLEMS, HOW DIFFICULT HAVE THESE PROBLEMS MADE IT FOR YOU TO DO YOUR WORK, TAKE CARE OF THINGS AT HOME, OR GET ALONG WITH OTHER PEOPLE: SOMEWHAT DIFFICULT
1. LITTLE INTEREST OR PLEASURE IN DOING THINGS: NOT AT ALL

## 2024-10-21 ASSESSMENT — ENCOUNTER SYMPTOMS
DEPRESSION: 1
LOSS OF SENSATION IN FEET: 0
OCCASIONAL FEELINGS OF UNSTEADINESS: 0

## 2025-04-11 NOTE — PROGRESS NOTES
Subjective   Niurka Parada is a 74 y.o.   female. 6 month follow up  HPI  The patient is being seen after being hospitalized for right anterior medulla stroke on 6-14-24.? They have left sided sensory loss and weakness residual deficit. They are currently taking asa and statin and are doing well. Patient denies any extensive bruising or bleeding while taking this medication. Recent hospitalizations: August 2024 for Anemia.  Today, she continues with left sided N&T in hand and foot. She completed her PT. Continues with asa and statin. Denies bleeding issues. She is using a cane for long distances. No falls at home. No hospitalizations since last seen. She has seen her PCP follow ups and other clinicians for UTI. She is having left lower back pain x a couple months. Denies extension to left leg. Denies recent injuries or trauma. She is taking Tylenol OTC which helps some. She was previously taking Mobic for back pain that was stopped after her CVA. She denies dysuria.  Review of systems are negative unless otherwise specified in HPI.   Objective   Neurological Exam  Mental Status  Awake, alert and oriented to person, place and time. Speech is normal. Language is fluent with no aphasia. Attention and concentration are normal.    Cranial Nerves  CN III, IV, VI: Pupils equal round and reactive to light bilaterally.  And EOM's Normal.    Motor   Strength is 5/5 throughout all four extremities.    Sensory  Light touch is normal in upper and lower extremities. N&T to left upper and lower extremities.     Reflexes                                            Right                      Left  Brachioradialis                                            3+  Biceps                                                         3+  Triceps                                                        3+  Hamstring                                                    3+  Patellar                                                         3+    Gait  Casual gait: Ataxic gait.    Physical Exam  Eyes:      Pupils: Pupils are equal, round, and reactive to light.   Neurological:      Motor: Motor strength is normal.     Deep Tendon Reflexes:      Reflex Scores:       Tricep reflexes are 3+ on the left side.       Bicep reflexes are 3+ on the left side.       Brachioradialis reflexes are 3+ on the left side.       Patellar reflexes are 3+ on the left side.  Psychiatric:         Speech: Speech normal.           Assessment/Plan   #CVA     Continue with current regimen. Follow up in 6 months.  Discussed bleeding precautions with patient while on anti platelet and/or anticoagulation therapy. Discussed stroke prevention such as: HgbA1c <7, tight blood pressure control <130/<80, LDL <70 with statin therapy (if indicated), CPAP/BiPAP compliance (if indicated) and lifestyle modification (Mediterranean diet, daily exercise, nicotine cessation & limiting alcohol use).   Discussed s/sx of stroke such as: face drooping, arm/leg weakness, speech difficulty; sudden numbness of face/extremity, sudden confusion, sudden trouble seeing, sudden trouble walking, sudden severe headache, dizziness, loss of balance or coordination and to call 911 immediately.?      #Lumbar back pain  Will get xray  She can follow up with her PCP next month

## 2025-04-16 ENCOUNTER — APPOINTMENT (OUTPATIENT)
Dept: NEUROLOGY | Facility: CLINIC | Age: 75
End: 2025-04-16
Payer: MEDICARE

## 2025-04-16 VITALS
SYSTOLIC BLOOD PRESSURE: 118 MMHG | DIASTOLIC BLOOD PRESSURE: 74 MMHG | WEIGHT: 184 LBS | BODY MASS INDEX: 31.41 KG/M2 | HEIGHT: 64 IN | HEART RATE: 78 BPM

## 2025-04-16 DIAGNOSIS — M54.50 LUMBAR PAIN: Primary | ICD-10-CM

## 2025-04-16 PROCEDURE — 1159F MED LIST DOCD IN RCRD: CPT

## 2025-04-16 PROCEDURE — 99214 OFFICE O/P EST MOD 30 MIN: CPT

## 2025-04-16 PROCEDURE — 1036F TOBACCO NON-USER: CPT

## 2025-04-16 PROCEDURE — 3008F BODY MASS INDEX DOCD: CPT

## 2025-04-16 RX ORDER — LEVOTHYROXINE SODIUM 75 UG/1
75 TABLET ORAL
COMMUNITY
Start: 2024-08-08

## 2025-04-16 ASSESSMENT — PATIENT HEALTH QUESTIONNAIRE - PHQ9
2. FEELING DOWN, DEPRESSED OR HOPELESS: SEVERAL DAYS
10. IF YOU CHECKED OFF ANY PROBLEMS, HOW DIFFICULT HAVE THESE PROBLEMS MADE IT FOR YOU TO DO YOUR WORK, TAKE CARE OF THINGS AT HOME, OR GET ALONG WITH OTHER PEOPLE: SOMEWHAT DIFFICULT
1. LITTLE INTEREST OR PLEASURE IN DOING THINGS: NOT AT ALL
SUM OF ALL RESPONSES TO PHQ9 QUESTIONS 1 & 2: 1

## 2025-04-16 ASSESSMENT — ENCOUNTER SYMPTOMS
OCCASIONAL FEELINGS OF UNSTEADINESS: 1
DEPRESSION: 0
LOSS OF SENSATION IN FEET: 1

## 2025-09-03 ENCOUNTER — HOSPITAL ENCOUNTER (EMERGENCY)
Facility: HOSPITAL | Age: 75
Discharge: HOME | End: 2025-09-04
Attending: STUDENT IN AN ORGANIZED HEALTH CARE EDUCATION/TRAINING PROGRAM
Payer: MEDICARE

## 2025-09-03 ENCOUNTER — APPOINTMENT (OUTPATIENT)
Dept: RADIOLOGY | Facility: HOSPITAL | Age: 75
End: 2025-09-03
Payer: MEDICARE

## 2025-09-03 DIAGNOSIS — R51.9 ACUTE NONINTRACTABLE HEADACHE, UNSPECIFIED HEADACHE TYPE: Primary | ICD-10-CM

## 2025-09-03 DIAGNOSIS — R42 DIZZINESS: ICD-10-CM

## 2025-09-03 DIAGNOSIS — E83.42 HYPOMAGNESEMIA: ICD-10-CM

## 2025-09-03 PROCEDURE — 99285 EMERGENCY DEPT VISIT HI MDM: CPT | Performed by: STUDENT IN AN ORGANIZED HEALTH CARE EDUCATION/TRAINING PROGRAM

## 2025-09-03 RX ORDER — METOCLOPRAMIDE HYDROCHLORIDE 5 MG/ML
10 INJECTION INTRAMUSCULAR; INTRAVENOUS ONCE
Status: COMPLETED | OUTPATIENT
Start: 2025-09-03 | End: 2025-09-04

## 2025-09-03 RX ORDER — DIPHENHYDRAMINE HYDROCHLORIDE 50 MG/ML
25 INJECTION, SOLUTION INTRAMUSCULAR; INTRAVENOUS ONCE
Status: COMPLETED | OUTPATIENT
Start: 2025-09-03 | End: 2025-09-04

## 2025-09-03 ASSESSMENT — LIFESTYLE VARIABLES
HAVE PEOPLE ANNOYED YOU BY CRITICIZING YOUR DRINKING: NO
HAVE YOU EVER FELT YOU SHOULD CUT DOWN ON YOUR DRINKING: NO
TOTAL SCORE: 0
EVER HAD A DRINK FIRST THING IN THE MORNING TO STEADY YOUR NERVES TO GET RID OF A HANGOVER: NO
EVER FELT BAD OR GUILTY ABOUT YOUR DRINKING: NO

## 2025-09-04 ENCOUNTER — APPOINTMENT (OUTPATIENT)
Dept: RADIOLOGY | Facility: HOSPITAL | Age: 75
End: 2025-09-04
Payer: MEDICARE

## 2025-09-04 ENCOUNTER — APPOINTMENT (OUTPATIENT)
Dept: CARDIOLOGY | Facility: HOSPITAL | Age: 75
End: 2025-09-04
Payer: MEDICARE

## 2025-09-04 VITALS
WEIGHT: 185 LBS | TEMPERATURE: 96.8 F | SYSTOLIC BLOOD PRESSURE: 134 MMHG | BODY MASS INDEX: 31.58 KG/M2 | DIASTOLIC BLOOD PRESSURE: 69 MMHG | HEART RATE: 74 BPM | HEIGHT: 64 IN | RESPIRATION RATE: 19 BRPM | OXYGEN SATURATION: 99 %

## 2025-09-04 LAB
ALBUMIN SERPL BCP-MCNC: 4 G/DL (ref 3.4–5)
ALP SERPL-CCNC: 75 U/L (ref 33–136)
ALT SERPL W P-5'-P-CCNC: 14 U/L (ref 7–45)
ANION GAP SERPL CALC-SCNC: 12 MMOL/L (ref 10–20)
AST SERPL W P-5'-P-CCNC: 21 U/L (ref 9–39)
BASOPHILS # BLD AUTO: 0.03 X10*3/UL (ref 0–0.1)
BASOPHILS NFR BLD AUTO: 0.4 %
BILIRUB SERPL-MCNC: 0.6 MG/DL (ref 0–1.2)
BUN SERPL-MCNC: 23 MG/DL (ref 6–23)
CALCIUM SERPL-MCNC: 9.5 MG/DL (ref 8.6–10.3)
CARDIAC TROPONIN I PNL SERPL HS: 6 NG/L (ref 0–13)
CHLORIDE SERPL-SCNC: 97 MMOL/L (ref 98–107)
CO2 SERPL-SCNC: 27 MMOL/L (ref 21–32)
CREAT SERPL-MCNC: 1.26 MG/DL (ref 0.5–1.05)
EGFRCR SERPLBLD CKD-EPI 2021: 45 ML/MIN/1.73M*2
EOSINOPHIL # BLD AUTO: 0.13 X10*3/UL (ref 0–0.4)
EOSINOPHIL NFR BLD AUTO: 1.9 %
ERYTHROCYTE [DISTWIDTH] IN BLOOD BY AUTOMATED COUNT: 14.3 % (ref 11.5–14.5)
GLUCOSE SERPL-MCNC: 152 MG/DL (ref 74–99)
HCT VFR BLD AUTO: 33.9 % (ref 36–46)
HGB BLD-MCNC: 11.5 G/DL (ref 12–16)
IMM GRANULOCYTES # BLD AUTO: 0.02 X10*3/UL (ref 0–0.5)
IMM GRANULOCYTES NFR BLD AUTO: 0.3 % (ref 0–0.9)
LYMPHOCYTES # BLD AUTO: 1.6 X10*3/UL (ref 0.8–3)
LYMPHOCYTES NFR BLD AUTO: 23 %
MAGNESIUM SERPL-MCNC: 1.44 MG/DL (ref 1.6–2.4)
MCH RBC QN AUTO: 31.4 PG (ref 26–34)
MCHC RBC AUTO-ENTMCNC: 33.9 G/DL (ref 32–36)
MCV RBC AUTO: 93 FL (ref 80–100)
MONOCYTES # BLD AUTO: 0.42 X10*3/UL (ref 0.05–0.8)
MONOCYTES NFR BLD AUTO: 6 %
NEUTROPHILS # BLD AUTO: 4.77 X10*3/UL (ref 1.6–5.5)
NEUTROPHILS NFR BLD AUTO: 68.4 %
NRBC BLD-RTO: 0 /100 WBCS (ref 0–0)
PLATELET # BLD AUTO: 191 X10*3/UL (ref 150–450)
POTASSIUM SERPL-SCNC: 4.6 MMOL/L (ref 3.5–5.3)
PROT SERPL-MCNC: 5.9 G/DL (ref 6.4–8.2)
RBC # BLD AUTO: 3.66 X10*6/UL (ref 4–5.2)
SODIUM SERPL-SCNC: 131 MMOL/L (ref 136–145)
WBC # BLD AUTO: 7 X10*3/UL (ref 4.4–11.3)

## 2025-09-04 PROCEDURE — 70450 CT HEAD/BRAIN W/O DYE: CPT

## 2025-09-04 PROCEDURE — 84484 ASSAY OF TROPONIN QUANT: CPT | Performed by: STUDENT IN AN ORGANIZED HEALTH CARE EDUCATION/TRAINING PROGRAM

## 2025-09-04 PROCEDURE — 80053 COMPREHEN METABOLIC PANEL: CPT | Performed by: STUDENT IN AN ORGANIZED HEALTH CARE EDUCATION/TRAINING PROGRAM

## 2025-09-04 PROCEDURE — 70450 CT HEAD/BRAIN W/O DYE: CPT | Performed by: RADIOLOGY

## 2025-09-04 PROCEDURE — 83735 ASSAY OF MAGNESIUM: CPT | Performed by: STUDENT IN AN ORGANIZED HEALTH CARE EDUCATION/TRAINING PROGRAM

## 2025-09-04 PROCEDURE — 2500000004 HC RX 250 GENERAL PHARMACY W/ HCPCS (ALT 636 FOR OP/ED): Performed by: STUDENT IN AN ORGANIZED HEALTH CARE EDUCATION/TRAINING PROGRAM

## 2025-09-04 PROCEDURE — 85025 COMPLETE CBC W/AUTO DIFF WBC: CPT | Performed by: STUDENT IN AN ORGANIZED HEALTH CARE EDUCATION/TRAINING PROGRAM

## 2025-09-04 PROCEDURE — 71045 X-RAY EXAM CHEST 1 VIEW: CPT

## 2025-09-04 PROCEDURE — 93005 ELECTROCARDIOGRAM TRACING: CPT

## 2025-09-04 RX ORDER — MAGNESIUM SULFATE HEPTAHYDRATE 40 MG/ML
2 INJECTION, SOLUTION INTRAVENOUS ONCE
Status: COMPLETED | OUTPATIENT
Start: 2025-09-04 | End: 2025-09-04

## 2025-09-04 RX ADMIN — SODIUM CHLORIDE, SODIUM LACTATE, POTASSIUM CHLORIDE, AND CALCIUM CHLORIDE 1000 ML: .6; .31; .03; .02 INJECTION, SOLUTION INTRAVENOUS at 00:17

## 2025-09-04 RX ADMIN — METOCLOPRAMIDE HYDROCHLORIDE 10 MG: 5 INJECTION INTRAMUSCULAR; INTRAVENOUS at 00:17

## 2025-09-04 RX ADMIN — DIPHENHYDRAMINE HYDROCHLORIDE 25 MG: 50 INJECTION, SOLUTION INTRAMUSCULAR; INTRAVENOUS at 00:17

## 2025-09-04 RX ADMIN — MAGNESIUM SULFATE HEPTAHYDRATE 2 G: 40 INJECTION, SOLUTION INTRAVENOUS at 01:53

## 2025-09-05 LAB
ATRIAL RATE: 71 BPM
P AXIS: 55 DEGREES
P OFFSET: 195 MS
P ONSET: 139 MS
PR INTERVAL: 170 MS
Q ONSET: 224 MS
QRS COUNT: 11 BEATS
QRS DURATION: 88 MS
QT INTERVAL: 432 MS
QTC CALCULATION(BAZETT): 469 MS
QTC FREDERICIA: 457 MS
R AXIS: -15 DEGREES
T AXIS: 6 DEGREES
T OFFSET: 440 MS
VENTRICULAR RATE: 71 BPM

## 2025-10-15 ENCOUNTER — APPOINTMENT (OUTPATIENT)
Dept: NEUROLOGY | Facility: CLINIC | Age: 75
End: 2025-10-15
Payer: MEDICARE